# Patient Record
Sex: MALE | Race: WHITE | Employment: UNEMPLOYED | URBAN - METROPOLITAN AREA
[De-identification: names, ages, dates, MRNs, and addresses within clinical notes are randomized per-mention and may not be internally consistent; named-entity substitution may affect disease eponyms.]

---

## 2017-06-27 ENCOUNTER — HOSPITAL ENCOUNTER (OUTPATIENT)
Dept: OTHER | Age: 57
Discharge: HOME | End: 2017-06-27
Attending: INTERNAL MEDICINE | Admitting: INTERNAL MEDICINE

## 2017-06-27 LAB
ALBUMIN SERPL-MCNC: 4.3 GM/DL (ref 3.4–5)
ALP BLD-CCNC: 137 IU/L (ref 40–128)
ALT SERPL-CCNC: 34 U/L (ref 10–40)
ANION GAP SERPL CALCULATED.3IONS-SCNC: 16 MMOL/L (ref 4–16)
AST SERPL-CCNC: 22 IU/L (ref 15–37)
BILIRUB SERPL-MCNC: 0.7 MG/DL (ref 0–1)
BUN BLDV-MCNC: 16 MG/DL (ref 6–23)
CALCIUM SERPL-MCNC: 9.9 MG/DL (ref 8.3–10.6)
CHLORIDE BLD-SCNC: 103 MMOL/L (ref 99–110)
CO2: 23 MMOL/L (ref 21–32)
CREAT SERPL-MCNC: 0.9 MG/DL (ref 0.9–1.3)
GFR AFRICAN AMERICAN: >60 ML/MIN/1.73M2
GFR NON-AFRICAN AMERICAN: >60 ML/MIN/1.73M2
GLUCOSE BLD-MCNC: 117 MG/DL (ref 70–140)
MAGNESIUM: 2.2 MG/DL (ref 1.8–2.4)
POTASSIUM SERPL-SCNC: 4.3 MMOL/L (ref 3.5–5.1)
SODIUM BLD-SCNC: 142 MMOL/L (ref 135–145)
TOTAL PROTEIN: 7.1 GM/DL (ref 6.4–8.2)

## 2017-07-10 ENCOUNTER — HOSPITAL ENCOUNTER (OUTPATIENT)
Dept: PET IMAGING | Age: 57
Discharge: HOME | End: 2017-07-10
Attending: RADIOLOGY | Admitting: RADIOLOGY

## 2017-07-10 DIAGNOSIS — C09.8 CANCER OF OVERLAPPING SITES OF TONSIL (HCC): ICD-10-CM

## 2017-07-11 ENCOUNTER — HOSPITAL ENCOUNTER (OUTPATIENT)
Dept: OTHER | Age: 57
Discharge: HOME | End: 2017-07-11
Attending: INTERNAL MEDICINE | Admitting: INTERNAL MEDICINE

## 2017-07-11 LAB
ALBUMIN SERPL-MCNC: 4.2 GM/DL (ref 3.4–5)
ALP BLD-CCNC: 128 IU/L (ref 40–129)
ALT SERPL-CCNC: 45 U/L (ref 10–40)
ANION GAP SERPL CALCULATED.3IONS-SCNC: 15 MMOL/L (ref 4–16)
AST SERPL-CCNC: 26 IU/L (ref 15–37)
BILIRUB SERPL-MCNC: 0.6 MG/DL (ref 0–1)
BUN BLDV-MCNC: 20 MG/DL (ref 6–23)
CALCIUM SERPL-MCNC: 9.4 MG/DL (ref 8.3–10.6)
CHLORIDE BLD-SCNC: 106 MMOL/L (ref 99–110)
CO2: 20 MMOL/L (ref 21–32)
CREAT SERPL-MCNC: 1 MG/DL (ref 0.9–1.3)
GFR AFRICAN AMERICAN: >60 ML/MIN/1.73M2
GFR NON-AFRICAN AMERICAN: >60 ML/MIN/1.73M2
GLUCOSE BLD-MCNC: 113 MG/DL (ref 70–140)
MAGNESIUM: 2.3 MG/DL (ref 1.8–2.4)
POTASSIUM SERPL-SCNC: 4.6 MMOL/L (ref 3.5–5.1)
SODIUM BLD-SCNC: 141 MMOL/L (ref 135–145)
TOTAL PROTEIN: 6.9 GM/DL (ref 6.4–8.2)

## 2017-07-18 ENCOUNTER — HOSPITAL ENCOUNTER (OUTPATIENT)
Dept: OTHER | Age: 57
Discharge: HOME | End: 2017-07-18
Attending: INTERNAL MEDICINE | Admitting: INTERNAL MEDICINE

## 2017-07-18 LAB
ALBUMIN SERPL-MCNC: 4.2 GM/DL (ref 3.4–5)
ALP BLD-CCNC: 116 IU/L (ref 40–129)
ALT SERPL-CCNC: 37 U/L (ref 10–40)
ANION GAP SERPL CALCULATED.3IONS-SCNC: 15 MMOL/L (ref 4–16)
AST SERPL-CCNC: 26 IU/L (ref 15–37)
BILIRUB SERPL-MCNC: 0.8 MG/DL (ref 0–1)
BUN BLDV-MCNC: 16 MG/DL (ref 6–23)
CALCIUM SERPL-MCNC: 9.2 MG/DL (ref 8.3–10.6)
CHLORIDE BLD-SCNC: 102 MMOL/L (ref 99–110)
CO2: 22 MMOL/L (ref 21–32)
CREAT SERPL-MCNC: 0.9 MG/DL (ref 0.9–1.3)
GFR AFRICAN AMERICAN: >60 ML/MIN/1.73M2
GFR NON-AFRICAN AMERICAN: >60 ML/MIN/1.73M2
GLUCOSE BLD-MCNC: 88 MG/DL (ref 70–140)
MAGNESIUM: 2.5 MG/DL (ref 1.8–2.4)
POTASSIUM SERPL-SCNC: 4.8 MMOL/L (ref 3.5–5.1)
SODIUM BLD-SCNC: 139 MMOL/L (ref 135–145)
TOTAL PROTEIN: 6.7 GM/DL (ref 6.4–8.2)

## 2017-08-01 ENCOUNTER — HOSPITAL ENCOUNTER (OUTPATIENT)
Dept: OTHER | Age: 57
Discharge: HOME | End: 2017-08-01
Attending: INTERNAL MEDICINE | Admitting: INTERNAL MEDICINE

## 2017-08-01 LAB
ALBUMIN SERPL-MCNC: 3.9 GM/DL (ref 3.4–5)
ALP BLD-CCNC: 113 IU/L (ref 40–128)
ALT SERPL-CCNC: 25 U/L (ref 10–40)
ANION GAP SERPL CALCULATED.3IONS-SCNC: 17 MMOL/L (ref 4–16)
AST SERPL-CCNC: 17 IU/L (ref 15–37)
BILIRUB SERPL-MCNC: 0.6 MG/DL (ref 0–1)
BUN BLDV-MCNC: 26 MG/DL (ref 6–23)
CALCIUM SERPL-MCNC: 8.9 MG/DL (ref 8.3–10.6)
CHLORIDE BLD-SCNC: 100 MMOL/L (ref 99–110)
CO2: 22 MMOL/L (ref 21–32)
CREAT SERPL-MCNC: 1 MG/DL (ref 0.9–1.3)
GFR AFRICAN AMERICAN: >60 ML/MIN/1.73M2
GFR NON-AFRICAN AMERICAN: >60 ML/MIN/1.73M2
GLUCOSE BLD-MCNC: 113 MG/DL (ref 70–140)
MAGNESIUM: 2.1 MG/DL (ref 1.8–2.4)
POTASSIUM SERPL-SCNC: 3.9 MMOL/L (ref 3.5–5.1)
SODIUM BLD-SCNC: 139 MMOL/L (ref 135–145)
TOTAL PROTEIN: 6.4 GM/DL (ref 6.4–8.2)

## 2017-08-08 ENCOUNTER — HOSPITAL ENCOUNTER (OUTPATIENT)
Dept: OTHER | Age: 57
Discharge: HOME | End: 2017-08-08
Attending: INTERNAL MEDICINE | Admitting: INTERNAL MEDICINE

## 2017-08-08 LAB
ALBUMIN SERPL-MCNC: 4 GM/DL (ref 3.4–5)
ALP BLD-CCNC: 107 IU/L (ref 40–129)
ALT SERPL-CCNC: 27 U/L (ref 10–40)
ANION GAP SERPL CALCULATED.3IONS-SCNC: 14 MMOL/L (ref 4–16)
AST SERPL-CCNC: 21 IU/L (ref 15–37)
BILIRUB SERPL-MCNC: 0.5 MG/DL (ref 0–1)
BUN BLDV-MCNC: 26 MG/DL (ref 6–23)
CALCIUM SERPL-MCNC: 9 MG/DL (ref 8.3–10.6)
CHLORIDE BLD-SCNC: 100 MMOL/L (ref 99–110)
CO2: 24 MMOL/L (ref 21–32)
CREAT SERPL-MCNC: 1 MG/DL (ref 0.9–1.3)
GFR AFRICAN AMERICAN: >60 ML/MIN/1.73M2
GFR NON-AFRICAN AMERICAN: >60 ML/MIN/1.73M2
GLUCOSE BLD-MCNC: 118 MG/DL (ref 70–140)
MAGNESIUM: 2.4 MG/DL (ref 1.8–2.4)
POTASSIUM SERPL-SCNC: 4 MMOL/L (ref 3.5–5.1)
SODIUM BLD-SCNC: 138 MMOL/L (ref 135–145)
TOTAL PROTEIN: 6.7 GM/DL (ref 6.4–8.2)

## 2017-08-15 ENCOUNTER — HOSPITAL ENCOUNTER (OUTPATIENT)
Dept: OTHER | Age: 57
Discharge: HOME | End: 2017-08-15
Attending: INTERNAL MEDICINE | Admitting: INTERNAL MEDICINE

## 2017-08-15 LAB
ALBUMIN SERPL-MCNC: 4.2 GM/DL (ref 3.4–5)
ALP BLD-CCNC: 112 IU/L (ref 40–128)
ALT SERPL-CCNC: 28 U/L (ref 10–40)
ANION GAP SERPL CALCULATED.3IONS-SCNC: 17 MMOL/L (ref 4–16)
AST SERPL-CCNC: 24 IU/L (ref 15–37)
BILIRUB SERPL-MCNC: 0.5 MG/DL (ref 0–1)
BUN BLDV-MCNC: 29 MG/DL (ref 6–23)
CALCIUM SERPL-MCNC: 9.3 MG/DL (ref 8.3–10.6)
CHLORIDE BLD-SCNC: 97 MMOL/L (ref 99–110)
CO2: 23 MMOL/L (ref 21–32)
CREAT SERPL-MCNC: 1.1 MG/DL (ref 0.9–1.3)
GFR AFRICAN AMERICAN: >60 ML/MIN/1.73M2
GFR NON-AFRICAN AMERICAN: >60 ML/MIN/1.73M2
GLUCOSE BLD-MCNC: 106 MG/DL (ref 70–140)
MAGNESIUM: 2.2 MG/DL (ref 1.8–2.4)
POTASSIUM SERPL-SCNC: 3.9 MMOL/L (ref 3.5–5.1)
SODIUM BLD-SCNC: 137 MMOL/L (ref 135–145)
TOTAL PROTEIN: 7.1 GM/DL (ref 6.4–8.2)

## 2017-08-22 ENCOUNTER — HOSPITAL ENCOUNTER (OUTPATIENT)
Dept: OTHER | Age: 57
Discharge: HOME | End: 2017-08-22
Attending: INTERNAL MEDICINE | Admitting: INTERNAL MEDICINE

## 2017-08-22 LAB
ALBUMIN SERPL-MCNC: 3.5 GM/DL (ref 3.4–5)
ALP BLD-CCNC: 89 IU/L (ref 40–128)
ALT SERPL-CCNC: 23 U/L (ref 10–40)
ANION GAP SERPL CALCULATED.3IONS-SCNC: 13 MMOL/L (ref 4–16)
AST SERPL-CCNC: 19 IU/L (ref 15–37)
BILIRUB SERPL-MCNC: 0.6 MG/DL (ref 0–1)
BUN BLDV-MCNC: 27 MG/DL (ref 6–23)
CALCIUM SERPL-MCNC: 8.1 MG/DL (ref 8.3–10.6)
CHLORIDE BLD-SCNC: 101 MMOL/L (ref 99–110)
CO2: 23 MMOL/L (ref 21–32)
CREAT SERPL-MCNC: 1 MG/DL (ref 0.9–1.3)
GFR AFRICAN AMERICAN: >60 ML/MIN/1.73M2
GFR NON-AFRICAN AMERICAN: >60 ML/MIN/1.73M2
GLUCOSE BLD-MCNC: 119 MG/DL (ref 70–140)
MAGNESIUM: 1.8 MG/DL (ref 1.8–2.4)
POTASSIUM SERPL-SCNC: 3.7 MMOL/L (ref 3.5–5.1)
SODIUM BLD-SCNC: 137 MMOL/L (ref 135–145)
TOTAL PROTEIN: 6 GM/DL (ref 6.4–8.2)

## 2017-08-31 ENCOUNTER — TELEPHONE (OUTPATIENT)
Dept: NUTRITION | Age: 57
End: 2017-08-31

## 2017-10-30 ENCOUNTER — HOSPITAL ENCOUNTER (OUTPATIENT)
Dept: OTHER | Age: 57
Discharge: HOME | End: 2017-10-30
Attending: INTERNAL MEDICINE | Admitting: INTERNAL MEDICINE

## 2017-10-30 LAB
ALBUMIN SERPL-MCNC: 4.4 GM/DL (ref 3.4–5)
ALP BLD-CCNC: 93 IU/L (ref 40–129)
ALT SERPL-CCNC: 15 U/L (ref 10–40)
ANION GAP SERPL CALCULATED.3IONS-SCNC: 11 MMOL/L (ref 4–16)
AST SERPL-CCNC: 17 IU/L (ref 15–37)
BILIRUB SERPL-MCNC: 0.5 MG/DL (ref 0–1)
BUN BLDV-MCNC: 15 MG/DL (ref 6–23)
CALCIUM SERPL-MCNC: 9.8 MG/DL (ref 8.3–10.6)
CHLORIDE BLD-SCNC: 101 MMOL/L (ref 99–110)
CO2: 30 MMOL/L (ref 21–32)
CREAT SERPL-MCNC: 1 MG/DL (ref 0.9–1.3)
GFR AFRICAN AMERICAN: >60 ML/MIN/1.73M2
GFR NON-AFRICAN AMERICAN: >60 ML/MIN/1.73M2
GLUCOSE BLD-MCNC: 100 MG/DL (ref 70–140)
POTASSIUM SERPL-SCNC: 4.6 MMOL/L (ref 3.5–5.1)
SODIUM BLD-SCNC: 142 MMOL/L (ref 135–145)
TOTAL PROTEIN: 6.9 GM/DL (ref 6.4–8.2)

## 2017-11-16 ENCOUNTER — HOSPITAL ENCOUNTER (OUTPATIENT)
Dept: GENERAL RADIOLOGY | Age: 57
Discharge: HOME | End: 2017-11-16
Attending: RADIOLOGY | Admitting: RADIOLOGY

## 2017-11-16 DIAGNOSIS — C09.8 MALIGNANT NEOPLASM OF OVERLAPPING SITES OF TONSIL (HCC): ICD-10-CM

## 2018-01-10 ENCOUNTER — HOSPITAL ENCOUNTER (OUTPATIENT)
Dept: GENERAL RADIOLOGY | Age: 58
Discharge: HOME | End: 2018-01-10
Attending: OTOLARYNGOLOGY | Admitting: OTOLARYNGOLOGY

## 2018-01-10 DIAGNOSIS — R13.14 DYSPHAGIA, PHARYNGOESOPHAGEAL: ICD-10-CM

## 2018-01-10 NOTE — PROCEDURES
Gustavo was referred for OP MBSS by his ENT due to c/o foods (bread) sticking in his right pharynx. 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 He c/o dysphagia, specifically to solids in the right pharynx with occasional choking, particularly with bread. Pt and wife deny any recent h/o of chest congestion or pneumonia. They report that pt initally lost 30 pounds but weight is trending upward slightly and pt does have PEG tube for nutrition (4 cans/day) in addition to eating by mouth. Pt carries water that he sips frequently for xerostomia and has been seen by a speech therapist for therapeutic exercises which I advised him to continue. The oral phase was moderately impaired due to reduce tongue base retraction resulting in moderate residue in the valleculae for all solids including puree, soft and regular solids. Edematous epiglottis and UES further contributing to residue. Material cleared by 50% with second and third swallows leaving a mild residue. The pharyngeal phase was characterized by mildly delayed initiation with premature pharyngeal entry and aspiration for thins by straw with immediate cough but no clearance of material.  Shallow penetration midway to the vocal folds for thins by cup 3/4 trials with slightly deeper penetration 1/4. All trials cleared from the laryngeal vestibule. Nectar thick liquids were well tolerated with only minimal penetration beneath the epiglottis and mild pharyngeal residue. A chin-tuck and right head turn were tested and ineffective for pharyngeal clearance (possibly due to swelling) or reduction of liquid penetration. Recommend:  Regular diet/Thins by CUP. NO STRAWS for thin liquids. Moisten bread or any other problematic textures with extended mastication time. Continue therapeutic exercises provided in pt's initial speech therapy.   Advise

## 2018-02-05 ENCOUNTER — HOSPITAL ENCOUNTER (OUTPATIENT)
Dept: OTHER | Age: 58
Discharge: HOME | End: 2018-02-05
Attending: INTERNAL MEDICINE | Admitting: INTERNAL MEDICINE

## 2018-02-05 LAB
ALBUMIN SERPL-MCNC: 4.6 GM/DL (ref 3.4–5)
ALP BLD-CCNC: 110 IU/L (ref 40–129)
ALT SERPL-CCNC: 14 U/L (ref 10–40)
ANION GAP SERPL CALCULATED.3IONS-SCNC: 11 MMOL/L (ref 4–16)
AST SERPL-CCNC: 18 IU/L (ref 15–37)
BILIRUB SERPL-MCNC: 0.5 MG/DL (ref 0–1)
BUN BLDV-MCNC: 16 MG/DL (ref 6–23)
CALCIUM SERPL-MCNC: 9.8 MG/DL (ref 8.3–10.6)
CHLORIDE BLD-SCNC: 99 MMOL/L (ref 99–110)
CO2: 30 MMOL/L (ref 21–32)
CREAT SERPL-MCNC: 1 MG/DL (ref 0.9–1.3)
FERRITIN: 216 NG/ML (ref 30–400)
FOLATE: >20 NG/ML (ref 3.1–17.5)
GFR AFRICAN AMERICAN: >60 ML/MIN/1.73M2
GFR NON-AFRICAN AMERICAN: >60 ML/MIN/1.73M2
GLUCOSE BLD-MCNC: 89 MG/DL (ref 70–99)
IRON: 60 UG/DL (ref 59–158)
PCT TRANSFERRIN: 20 % (ref 10–44)
POTASSIUM SERPL-SCNC: 4 MMOL/L (ref 3.5–5.1)
SODIUM BLD-SCNC: 140 MMOL/L (ref 135–145)
TOTAL IRON BINDING CAPACITY: 294 UG/DL (ref 250–450)
TOTAL PROTEIN: 7.3 GM/DL (ref 6.4–8.2)
TSH HIGH SENSITIVITY: 7.17 UIU/ML (ref 0.27–4.2)
UNSATURATED IRON BINDING CAPACITY: 234 UG/DL (ref 110–370)
VITAMIN B-12: 731.5 PG/ML (ref 211–911)

## 2018-05-14 ENCOUNTER — HOSPITAL ENCOUNTER (OUTPATIENT)
Dept: OTHER | Age: 58
Discharge: HOME | End: 2018-05-14
Attending: INTERNAL MEDICINE | Admitting: INTERNAL MEDICINE

## 2018-05-14 LAB
ALBUMIN SERPL-MCNC: 4.2 GM/DL (ref 3.4–5)
ALP BLD-CCNC: 141 IU/L (ref 40–128)
ALT SERPL-CCNC: 15 U/L (ref 10–40)
ANION GAP SERPL CALCULATED.3IONS-SCNC: 12 MMOL/L (ref 4–16)
AST SERPL-CCNC: 18 IU/L (ref 15–37)
BILIRUB SERPL-MCNC: 0.4 MG/DL (ref 0–1)
BUN BLDV-MCNC: 21 MG/DL (ref 6–23)
CALCIUM SERPL-MCNC: 9.3 MG/DL (ref 8.3–10.6)
CHLORIDE BLD-SCNC: 99 MMOL/L (ref 99–110)
CO2: 27 MMOL/L (ref 21–32)
CREAT SERPL-MCNC: 1 MG/DL (ref 0.9–1.3)
GFR AFRICAN AMERICAN: >60 ML/MIN/1.73M2
GFR NON-AFRICAN AMERICAN: >60 ML/MIN/1.73M2
GLUCOSE BLD-MCNC: 130 MG/DL (ref 70–99)
POTASSIUM SERPL-SCNC: 4 MMOL/L (ref 3.5–5.1)
SODIUM BLD-SCNC: 138 MMOL/L (ref 135–145)
T4 FREE: 1.01 NG/DL (ref 0.9–1.8)
TOTAL PROTEIN: 6.5 GM/DL (ref 6.4–8.2)
TSH HIGH SENSITIVITY: 7.45 UIU/ML (ref 0.27–4.2)

## 2018-11-08 ENCOUNTER — HOSPITAL ENCOUNTER (OUTPATIENT)
Age: 58
Setting detail: SPECIMEN
Discharge: HOME | End: 2018-11-08
Payer: COMMERCIAL

## 2018-11-08 LAB
ALBUMIN SERPL-MCNC: 4.1 GM/DL (ref 3.4–5)
ALP BLD-CCNC: 270 IU/L (ref 40–129)
ALT SERPL-CCNC: 21 U/L (ref 10–40)
ANION GAP SERPL CALCULATED.3IONS-SCNC: 10 MMOL/L (ref 4–16)
AST SERPL-CCNC: 24 IU/L (ref 15–37)
BILIRUB SERPL-MCNC: 0.6 MG/DL (ref 0–1)
BUN BLDV-MCNC: 19 MG/DL (ref 6–23)
CALCIUM SERPL-MCNC: 9.4 MG/DL (ref 8.3–10.6)
CHLORIDE BLD-SCNC: 101 MMOL/L (ref 99–110)
CO2: 29 MMOL/L (ref 21–32)
CREAT SERPL-MCNC: 0.9 MG/DL (ref 0.9–1.3)
GFR AFRICAN AMERICAN: >60 ML/MIN/1.73M2
GFR NON-AFRICAN AMERICAN: >60 ML/MIN/1.73M2
GLUCOSE BLD-MCNC: 112 MG/DL (ref 70–99)
POTASSIUM SERPL-SCNC: 4.4 MMOL/L (ref 3.5–5.1)
SODIUM BLD-SCNC: 140 MMOL/L (ref 135–145)
T4 FREE: 1.09 NG/DL (ref 0.9–1.8)
TOTAL PROTEIN: 6.8 GM/DL (ref 6.4–8.2)
TSH HIGH SENSITIVITY: 4.88 UIU/ML (ref 0.27–4.2)

## 2018-11-08 PROCEDURE — 84439 ASSAY OF FREE THYROXINE: CPT

## 2018-11-08 PROCEDURE — 80053 COMPREHEN METABOLIC PANEL: CPT

## 2018-11-08 PROCEDURE — 84443 ASSAY THYROID STIM HORMONE: CPT

## 2019-01-16 RX ORDER — LEVOTHYROXINE SODIUM 0.05 MG/1
50 TABLET ORAL DAILY
Status: ON HOLD | COMMUNITY
End: 2019-05-04

## 2019-01-17 ENCOUNTER — HOSPITAL ENCOUNTER (OUTPATIENT)
Dept: INTERVENTIONAL RADIOLOGY/VASCULAR | Age: 59
Discharge: HOME | End: 2019-01-17
Payer: COMMERCIAL

## 2019-01-17 VITALS
DIASTOLIC BLOOD PRESSURE: 68 MMHG | WEIGHT: 132 LBS | HEART RATE: 64 BPM | BODY MASS INDEX: 22.53 KG/M2 | HEIGHT: 64 IN | RESPIRATION RATE: 16 BRPM | SYSTOLIC BLOOD PRESSURE: 133 MMHG | TEMPERATURE: 99.4 F | OXYGEN SATURATION: 97 %

## 2019-01-17 LAB
APTT: 32.1 SECONDS (ref 21.2–33)
HCT VFR BLD CALC: 39.8 % (ref 42–52)
HEMOGLOBIN: 13 GM/DL (ref 13.5–18)
INR BLD: 0.97 INDEX
MCH RBC QN AUTO: 29.5 PG (ref 27–31)
MCHC RBC AUTO-ENTMCNC: 32.7 % (ref 32–36)
MCV RBC AUTO: 90.2 FL (ref 78–100)
PDW BLD-RTO: 14.2 % (ref 11.7–14.9)
PLATELET # BLD: 226 K/CU MM (ref 140–440)
PMV BLD AUTO: 9.9 FL (ref 7.5–11.1)
PROTHROMBIN TIME: 11.3 SECONDS (ref 9.12–12.5)
RBC # BLD: 4.41 M/CU MM (ref 4.6–6.2)
WBC # BLD: 7.7 K/CU MM (ref 4–10.5)

## 2019-01-17 PROCEDURE — 2709999900 HC NON-CHARGEABLE SUPPLY

## 2019-01-17 PROCEDURE — 7100000010 HC PHASE II RECOVERY - FIRST 15 MIN

## 2019-01-17 PROCEDURE — 76942 ECHO GUIDE FOR BIOPSY: CPT

## 2019-01-17 PROCEDURE — 88305 TISSUE EXAM BY PATHOLOGIST: CPT

## 2019-01-17 PROCEDURE — C1729 CATH, DRAINAGE: HCPCS

## 2019-01-17 PROCEDURE — 85610 PROTHROMBIN TIME: CPT

## 2019-01-17 PROCEDURE — 88173 CYTOPATH EVAL FNA REPORT: CPT

## 2019-01-17 PROCEDURE — 2580000003 HC RX 258: Performed by: RADIOLOGY

## 2019-01-17 PROCEDURE — 7100000011 HC PHASE II RECOVERY - ADDTL 15 MIN

## 2019-01-17 PROCEDURE — 10160 PNXR ASPIR ABSC HMTMA BULLA: CPT

## 2019-01-17 PROCEDURE — 85730 THROMBOPLASTIN TIME PARTIAL: CPT

## 2019-01-17 PROCEDURE — 85027 COMPLETE CBC AUTOMATED: CPT

## 2019-01-17 RX ORDER — SODIUM CHLORIDE 0.9 % (FLUSH) 0.9 %
10 SYRINGE (ML) INJECTION PRN
Status: DISCONTINUED | OUTPATIENT
Start: 2019-01-17 | End: 2019-01-18 | Stop reason: HOSPADM

## 2019-01-17 RX ADMIN — SODIUM CHLORIDE, PRESERVATIVE FREE 10 ML: 5 INJECTION INTRAVENOUS at 09:56

## 2019-01-17 ASSESSMENT — PAIN - FUNCTIONAL ASSESSMENT
PAIN_FUNCTIONAL_ASSESSMENT: 0-10
PAIN_FUNCTIONAL_ASSESSMENT: 0-10

## 2019-05-04 ENCOUNTER — APPOINTMENT (OUTPATIENT)
Dept: CT IMAGING | Age: 59
DRG: 442 | End: 2019-05-04
Payer: COMMERCIAL

## 2019-05-04 ENCOUNTER — APPOINTMENT (OUTPATIENT)
Dept: GENERAL RADIOLOGY | Age: 59
DRG: 442 | End: 2019-05-04
Payer: COMMERCIAL

## 2019-05-04 ENCOUNTER — HOSPITAL ENCOUNTER (INPATIENT)
Age: 59
LOS: 7 days | Discharge: ANOTHER HOSPITAL | DRG: 442 | End: 2019-05-11
Attending: EMERGENCY MEDICINE | Admitting: HOSPITALIST
Payer: COMMERCIAL

## 2019-05-04 DIAGNOSIS — R10.9 FLANK PAIN: Primary | ICD-10-CM

## 2019-05-04 DIAGNOSIS — R10.9 ABDOMINAL PAIN, UNSPECIFIED ABDOMINAL LOCATION: ICD-10-CM

## 2019-05-04 DIAGNOSIS — Z85.9 HISTORY OF CANCER: ICD-10-CM

## 2019-05-04 DIAGNOSIS — R16.0 LIVER MASS: ICD-10-CM

## 2019-05-04 DIAGNOSIS — K86.89 PANCREATIC MASS: ICD-10-CM

## 2019-05-04 LAB
ALBUMIN SERPL-MCNC: 3.9 GM/DL (ref 3.4–5)
ALP BLD-CCNC: 349 IU/L (ref 40–129)
ALT SERPL-CCNC: 28 U/L (ref 10–40)
ANION GAP SERPL CALCULATED.3IONS-SCNC: 14 MMOL/L (ref 4–16)
APTT: 31 SECONDS (ref 21.2–33)
AST SERPL-CCNC: 51 IU/L (ref 15–37)
BACTERIA: NEGATIVE /HPF
BASOPHILS ABSOLUTE: 0 K/CU MM
BASOPHILS RELATIVE PERCENT: 0.3 % (ref 0–1)
BILIRUB SERPL-MCNC: 0.5 MG/DL (ref 0–1)
BILIRUBIN URINE: NEGATIVE MG/DL
BLOOD, URINE: NEGATIVE
BUN BLDV-MCNC: 29 MG/DL (ref 6–23)
CALCIUM SERPL-MCNC: 9.6 MG/DL (ref 8.3–10.6)
CHLORIDE BLD-SCNC: 96 MMOL/L (ref 99–110)
CLARITY: CLEAR
CO2: 26 MMOL/L (ref 21–32)
COLOR: YELLOW
CREAT SERPL-MCNC: 1.3 MG/DL (ref 0.9–1.3)
DIFFERENTIAL TYPE: ABNORMAL
EOSINOPHILS ABSOLUTE: 0.2 K/CU MM
EOSINOPHILS RELATIVE PERCENT: 2 % (ref 0–3)
GFR AFRICAN AMERICAN: >60 ML/MIN/1.73M2
GFR NON-AFRICAN AMERICAN: 57 ML/MIN/1.73M2
GLUCOSE BLD-MCNC: 111 MG/DL (ref 70–99)
GLUCOSE, URINE: NEGATIVE MG/DL
HCT VFR BLD CALC: 32 % (ref 42–52)
HEMOGLOBIN: 10.2 GM/DL (ref 13.5–18)
IMMATURE NEUTROPHIL %: 0.7 % (ref 0–0.43)
INR BLD: 1.04 INDEX
KETONES, URINE: NEGATIVE MG/DL
LACTATE: 1.5 MMOL/L (ref 0.4–2)
LEUKOCYTE ESTERASE, URINE: NEGATIVE
LIPASE: 43 IU/L (ref 13–60)
LYMPHOCYTES ABSOLUTE: 1.5 K/CU MM
LYMPHOCYTES RELATIVE PERCENT: 14.2 % (ref 24–44)
MCH RBC QN AUTO: 29.1 PG (ref 27–31)
MCHC RBC AUTO-ENTMCNC: 31.9 % (ref 32–36)
MCV RBC AUTO: 91.4 FL (ref 78–100)
MONOCYTES ABSOLUTE: 1.1 K/CU MM
MONOCYTES RELATIVE PERCENT: 10.8 % (ref 0–4)
NITRITE URINE, QUANTITATIVE: NEGATIVE
NUCLEATED RBC %: 0 %
PDW BLD-RTO: 13.6 % (ref 11.7–14.9)
PH, URINE: 5 (ref 5–8)
PLATELET # BLD: 350 K/CU MM (ref 140–440)
PMV BLD AUTO: 9.7 FL (ref 7.5–11.1)
POTASSIUM SERPL-SCNC: 4.4 MMOL/L (ref 3.5–5.1)
PRO-BNP: 205.9 PG/ML
PROTEIN UA: 30 MG/DL
PROTHROMBIN TIME: 11.9 SECONDS (ref 9.12–12.5)
RBC # BLD: 3.5 M/CU MM (ref 4.6–6.2)
RBC URINE: 1 /HPF (ref 0–3)
SEGMENTED NEUTROPHILS ABSOLUTE COUNT: 7.6 K/CU MM
SEGMENTED NEUTROPHILS RELATIVE PERCENT: 72 % (ref 36–66)
SODIUM BLD-SCNC: 136 MMOL/L (ref 135–145)
SPECIFIC GRAVITY UA: 1.05 (ref 1–1.03)
SPECIFIC GRAVITY UA: ABNORMAL (ref 1–1.03)
SQUAMOUS EPITHELIAL: <1 /HPF
TOTAL CK: 38 IU/L (ref 38–174)
TOTAL IMMATURE NEUTOROPHIL: 0.07 K/CU MM
TOTAL NUCLEATED RBC: 0 K/CU MM
TOTAL PROTEIN: 7.5 GM/DL (ref 6.4–8.2)
TRICHOMONAS: ABNORMAL /HPF
TROPONIN T: <0.01 NG/ML
UROBILINOGEN, URINE: NORMAL MG/DL (ref 0.2–1)
WBC # BLD: 10.6 K/CU MM (ref 4–10.5)
WBC UA: <1 /HPF (ref 0–2)

## 2019-05-04 PROCEDURE — 36415 COLL VENOUS BLD VENIPUNCTURE: CPT

## 2019-05-04 PROCEDURE — 87040 BLOOD CULTURE FOR BACTERIA: CPT

## 2019-05-04 PROCEDURE — 6370000000 HC RX 637 (ALT 250 FOR IP): Performed by: INTERNAL MEDICINE

## 2019-05-04 PROCEDURE — 84484 ASSAY OF TROPONIN QUANT: CPT

## 2019-05-04 PROCEDURE — 6370000000 HC RX 637 (ALT 250 FOR IP): Performed by: HOSPITALIST

## 2019-05-04 PROCEDURE — 71045 X-RAY EXAM CHEST 1 VIEW: CPT

## 2019-05-04 PROCEDURE — 83880 ASSAY OF NATRIURETIC PEPTIDE: CPT

## 2019-05-04 PROCEDURE — 6360000004 HC RX CONTRAST MEDICATION: Performed by: EMERGENCY MEDICINE

## 2019-05-04 PROCEDURE — 85610 PROTHROMBIN TIME: CPT

## 2019-05-04 PROCEDURE — 2580000003 HC RX 258: Performed by: EMERGENCY MEDICINE

## 2019-05-04 PROCEDURE — 93005 ELECTROCARDIOGRAM TRACING: CPT | Performed by: EMERGENCY MEDICINE

## 2019-05-04 PROCEDURE — 87086 URINE CULTURE/COLONY COUNT: CPT

## 2019-05-04 PROCEDURE — 83690 ASSAY OF LIPASE: CPT

## 2019-05-04 PROCEDURE — 2580000003 HC RX 258: Performed by: HOSPITALIST

## 2019-05-04 PROCEDURE — 6360000002 HC RX W HCPCS: Performed by: HOSPITALIST

## 2019-05-04 PROCEDURE — 85730 THROMBOPLASTIN TIME PARTIAL: CPT

## 2019-05-04 PROCEDURE — 1200000000 HC SEMI PRIVATE

## 2019-05-04 PROCEDURE — 2580000003 HC RX 258: Performed by: INTERNAL MEDICINE

## 2019-05-04 PROCEDURE — 80053 COMPREHEN METABOLIC PANEL: CPT

## 2019-05-04 PROCEDURE — 71275 CT ANGIOGRAPHY CHEST: CPT

## 2019-05-04 PROCEDURE — 96376 TX/PRO/DX INJ SAME DRUG ADON: CPT

## 2019-05-04 PROCEDURE — 96365 THER/PROPH/DIAG IV INF INIT: CPT

## 2019-05-04 PROCEDURE — 6360000002 HC RX W HCPCS: Performed by: EMERGENCY MEDICINE

## 2019-05-04 PROCEDURE — 85025 COMPLETE CBC W/AUTO DIFF WBC: CPT

## 2019-05-04 PROCEDURE — 94761 N-INVAS EAR/PLS OXIMETRY MLT: CPT

## 2019-05-04 PROCEDURE — 83605 ASSAY OF LACTIC ACID: CPT

## 2019-05-04 PROCEDURE — 81001 URINALYSIS AUTO W/SCOPE: CPT

## 2019-05-04 PROCEDURE — 96375 TX/PRO/DX INJ NEW DRUG ADDON: CPT

## 2019-05-04 PROCEDURE — 82550 ASSAY OF CK (CPK): CPT

## 2019-05-04 PROCEDURE — 74174 CTA ABD&PLVS W/CONTRAST: CPT

## 2019-05-04 PROCEDURE — 99285 EMERGENCY DEPT VISIT HI MDM: CPT

## 2019-05-04 RX ORDER — LEVOTHYROXINE SODIUM 0.05 MG/1
50 TABLET ORAL DAILY
Status: ON HOLD | COMMUNITY
End: 2019-05-08 | Stop reason: HOSPADM

## 2019-05-04 RX ORDER — ONDANSETRON 2 MG/ML
4 INJECTION INTRAMUSCULAR; INTRAVENOUS EVERY 6 HOURS PRN
Status: DISCONTINUED | OUTPATIENT
Start: 2019-05-04 | End: 2019-05-11 | Stop reason: HOSPADM

## 2019-05-04 RX ORDER — MORPHINE SULFATE 4 MG/ML
4 INJECTION, SOLUTION INTRAMUSCULAR; INTRAVENOUS EVERY 4 HOURS PRN
Status: DISCONTINUED | OUTPATIENT
Start: 2019-05-04 | End: 2019-05-11 | Stop reason: HOSPADM

## 2019-05-04 RX ORDER — 0.9 % SODIUM CHLORIDE 0.9 %
1000 INTRAVENOUS SOLUTION INTRAVENOUS ONCE
Status: COMPLETED | OUTPATIENT
Start: 2019-05-04 | End: 2019-05-04

## 2019-05-04 RX ORDER — ONDANSETRON 2 MG/ML
4 INJECTION INTRAMUSCULAR; INTRAVENOUS EVERY 30 MIN PRN
Status: DISCONTINUED | OUTPATIENT
Start: 2019-05-04 | End: 2019-05-04 | Stop reason: SDUPTHER

## 2019-05-04 RX ORDER — OXYCODONE HYDROCHLORIDE 5 MG/1
5 TABLET ORAL EVERY 4 HOURS PRN
Status: DISCONTINUED | OUTPATIENT
Start: 2019-05-04 | End: 2019-05-11 | Stop reason: HOSPADM

## 2019-05-04 RX ORDER — SODIUM CHLORIDE 0.9 % (FLUSH) 0.9 %
10 SYRINGE (ML) INJECTION PRN
Status: DISCONTINUED | OUTPATIENT
Start: 2019-05-04 | End: 2019-05-11 | Stop reason: HOSPADM

## 2019-05-04 RX ORDER — LEVOTHYROXINE SODIUM 0.03 MG/1
25 TABLET ORAL DAILY
Status: ON HOLD | COMMUNITY
End: 2019-05-04

## 2019-05-04 RX ORDER — LEVOTHYROXINE SODIUM 0.05 MG/1
50 TABLET ORAL DAILY
Status: DISCONTINUED | OUTPATIENT
Start: 2019-05-04 | End: 2019-05-06

## 2019-05-04 RX ORDER — SODIUM CHLORIDE 0.9 % (FLUSH) 0.9 %
10 SYRINGE (ML) INJECTION EVERY 12 HOURS SCHEDULED
Status: DISCONTINUED | OUTPATIENT
Start: 2019-05-04 | End: 2019-05-11 | Stop reason: HOSPADM

## 2019-05-04 RX ORDER — HYDROCODONE BITARTRATE AND ACETAMINOPHEN 5; 325 MG/1; MG/1
1 TABLET ORAL EVERY 6 HOURS PRN
Status: DISCONTINUED | OUTPATIENT
Start: 2019-05-04 | End: 2019-05-04

## 2019-05-04 RX ORDER — SODIUM CHLORIDE 0.9 % (FLUSH) 0.9 %
10 SYRINGE (ML) INJECTION 2 TIMES DAILY
Status: DISCONTINUED | OUTPATIENT
Start: 2019-05-04 | End: 2019-05-11 | Stop reason: HOSPADM

## 2019-05-04 RX ORDER — FENTANYL CITRATE 50 UG/ML
50 INJECTION, SOLUTION INTRAMUSCULAR; INTRAVENOUS
Status: DISCONTINUED | OUTPATIENT
Start: 2019-05-04 | End: 2019-05-04 | Stop reason: ALTCHOICE

## 2019-05-04 RX ORDER — SODIUM CHLORIDE 0.9 % (FLUSH) 0.9 %
10 SYRINGE (ML) INJECTION
Status: DISCONTINUED | OUTPATIENT
Start: 2019-05-04 | End: 2019-05-11 | Stop reason: HOSPADM

## 2019-05-04 RX ORDER — SODIUM CHLORIDE 9 MG/ML
INJECTION, SOLUTION INTRAVENOUS CONTINUOUS
Status: DISCONTINUED | OUTPATIENT
Start: 2019-05-04 | End: 2019-05-11 | Stop reason: HOSPADM

## 2019-05-04 RX ADMIN — FENTANYL CITRATE 50 MCG: 50 INJECTION, SOLUTION INTRAMUSCULAR; INTRAVENOUS at 01:53

## 2019-05-04 RX ADMIN — SODIUM CHLORIDE 1000 ML: 9 INJECTION, SOLUTION INTRAVENOUS at 00:26

## 2019-05-04 RX ADMIN — OXYCODONE HYDROCHLORIDE 5 MG: 5 TABLET ORAL at 21:43

## 2019-05-04 RX ADMIN — TAZOBACTAM SODIUM AND PIPERACILLIN SODIUM 3.38 G: 375; 3 INJECTION, SOLUTION INTRAVENOUS at 02:44

## 2019-05-04 RX ADMIN — IOPAMIDOL 80 ML: 755 INJECTION, SOLUTION INTRAVENOUS at 01:15

## 2019-05-04 RX ADMIN — ONDANSETRON 4 MG: 2 INJECTION INTRAMUSCULAR; INTRAVENOUS at 00:30

## 2019-05-04 RX ADMIN — ONDANSETRON 4 MG: 2 INJECTION INTRAMUSCULAR; INTRAVENOUS at 15:20

## 2019-05-04 RX ADMIN — SODIUM CHLORIDE: 9 INJECTION, SOLUTION INTRAVENOUS at 21:44

## 2019-05-04 RX ADMIN — SODIUM CHLORIDE 1000 ML: 9 INJECTION, SOLUTION INTRAVENOUS at 02:44

## 2019-05-04 RX ADMIN — ONDANSETRON 4 MG: 2 INJECTION INTRAMUSCULAR; INTRAVENOUS at 04:30

## 2019-05-04 RX ADMIN — ENOXAPARIN SODIUM 40 MG: 40 INJECTION SUBCUTANEOUS at 09:45

## 2019-05-04 RX ADMIN — SODIUM CHLORIDE: 9 INJECTION, SOLUTION INTRAVENOUS at 13:39

## 2019-05-04 RX ADMIN — SODIUM CHLORIDE: 9 INJECTION, SOLUTION INTRAVENOUS at 04:15

## 2019-05-04 RX ADMIN — LEVOTHYROXINE SODIUM 50 MCG: 50 TABLET ORAL at 09:45

## 2019-05-04 RX ADMIN — HYDROCODONE BITARTRATE AND ACETAMINOPHEN 1 TABLET: 5; 325 TABLET ORAL at 04:16

## 2019-05-04 RX ADMIN — OXYCODONE HYDROCHLORIDE 5 MG: 5 TABLET ORAL at 15:23

## 2019-05-04 RX ADMIN — SODIUM CHLORIDE 1000 ML: 9 INJECTION, SOLUTION INTRAVENOUS at 00:45

## 2019-05-04 RX ADMIN — OXYCODONE HYDROCHLORIDE 5 MG: 5 TABLET ORAL at 09:45

## 2019-05-04 RX ADMIN — FENTANYL CITRATE 50 MCG: 50 INJECTION, SOLUTION INTRAMUSCULAR; INTRAVENOUS at 00:47

## 2019-05-04 ASSESSMENT — PAIN DESCRIPTION - PROGRESSION
CLINICAL_PROGRESSION: NOT CHANGED
CLINICAL_PROGRESSION: GRADUALLY WORSENING
CLINICAL_PROGRESSION: NOT CHANGED

## 2019-05-04 ASSESSMENT — PAIN SCALES - GENERAL
PAINLEVEL_OUTOF10: 7
PAINLEVEL_OUTOF10: 8
PAINLEVEL_OUTOF10: 10
PAINLEVEL_OUTOF10: 7
PAINLEVEL_OUTOF10: 9
PAINLEVEL_OUTOF10: 10
PAINLEVEL_OUTOF10: 10
PAINLEVEL_OUTOF10: 8
PAINLEVEL_OUTOF10: 10

## 2019-05-04 ASSESSMENT — PAIN DESCRIPTION - ONSET
ONSET: ON-GOING

## 2019-05-04 ASSESSMENT — PAIN DESCRIPTION - PAIN TYPE
TYPE: ACUTE PAIN

## 2019-05-04 ASSESSMENT — ENCOUNTER SYMPTOMS
EYES NEGATIVE: 1
COUGH: 1
ALLERGIC/IMMUNOLOGIC NEGATIVE: 1
ABDOMINAL PAIN: 1
NAUSEA: 1

## 2019-05-04 ASSESSMENT — PAIN DESCRIPTION - LOCATION
LOCATION: FLANK
LOCATION: RIB CAGE

## 2019-05-04 ASSESSMENT — PAIN - FUNCTIONAL ASSESSMENT: PAIN_FUNCTIONAL_ASSESSMENT: PREVENTS OR INTERFERES SOME ACTIVE ACTIVITIES AND ADLS

## 2019-05-04 ASSESSMENT — PAIN DESCRIPTION - DESCRIPTORS
DESCRIPTORS: ACHING
DESCRIPTORS: ACHING;SHARP
DESCRIPTORS: ACHING;SHARP

## 2019-05-04 ASSESSMENT — PAIN DESCRIPTION - FREQUENCY
FREQUENCY: CONTINUOUS

## 2019-05-04 ASSESSMENT — PAIN DESCRIPTION - ORIENTATION
ORIENTATION: RIGHT

## 2019-05-04 NOTE — ED PROVIDER NOTES
[facility]      TRIAGE CHIEF COMPLAINT:   Flank Pain (Complaints right flank pain that started around 6pm )      [place]:  Shantel Melo is a 63 y.o. male that presents with complaint of right-sided flank pain abdominal pain chest wall pain history of cancer stage III no longer on chemo, radiation. Sent in by his oncologist for fevers and pain. On arrival he is diaphoretic and breathing fast has no chest pain or shortness of breath no history of pulmonary embolism has had a history of cough. No depressions or concerns. Constant pain. Dao Castillo REVIEW OF SYSTEMS:  At least 10 systems reviewed and otherwise acutely negative except as in the [address] Review of Systems   Constitutional: Positive for chills, diaphoresis, fatigue and fever. HENT: Negative. Eyes: Negative. Respiratory: Positive for cough. Cardiovascular: Positive for chest pain. Gastrointestinal: Positive for abdominal pain and nausea. Endocrine: Negative. Genitourinary: Negative. Musculoskeletal: Positive for myalgias. Allergic/Immunologic: Negative. Neurological: Negative. Hematological: Negative. Psychiatric/Behavioral: Negative. All other systems reviewed and are negative.       Past Medical History:   Diagnosis Date    Cancer [facility])     -(path report 5/2017- metastatic high grade squamous cell ca- from lymph node right neck mass and right tonsil)- doing chemo and radiation Dr Monica Brown and Dr Elizabeth Arroyo done with chemo and radiation- per pt on 3/6/2018    Dysphagia     \"just started 8/7/2017- can not tolerate any solid foods- can drink water- protein drinks- baby food\" for peg tube insertion 8/16/2017( per pt on 3/6/2018 can eat pretty much anything now so taking the peg tube out)    Fear of needles     HX OTHER MEDICAL     \"passes out whenever they start an IV- takes Ativan and have him smell alcohol wipe- seems to help\"    Liver mass     \"found with my yearly check- for liver bx 1/17/2019    file     Attends meetings of clubs or organizations: Not on file     Relationship status: Not on file    Intimate partner violence:     Fear of current or ex partner: Not on file     Emotionally abused: Not on file     Physically abused: Not on file     Forced sexual activity: Not on file   Other Topics Concern    Not on file   Social History Narrative    Not on file     Current Facility-Administered Medications   Medication Dose Route Frequency Provider Last Rate Last Dose    ondansetron (ZOFRAN) injection 4 mg  4 mg Intravenous Q30 Min PRN Tato Garret, DO   4 mg at 05/04/19 0030    0.9 % sodium chloride bolus  1,000 mL Intravenous Once Tato Garret, DO 1,000 mL/hr at 05/04/19 0244 1,000 mL at 05/04/19 0244    fentaNYL (SUBLIMAZE) injection 50 mcg  50 mcg Intravenous Q1H PRN Nasir Gordon, DO   50 mcg at 05/04/19 0153    sodium chloride flush 0.9 % injection 10 mL  10 mL Intravenous BID Ttao Garret, DO        iopamidol (ISOVUE-370) 76 % injection 80 mL  80 mL Intravenous ONCE PRN Tato Garret, DO        sodium chloride flush 0.9 % injection 10 mL  10 mL Intravenous ONCE PRN Tato Garret, DO        piperacillin-tazobactam (ZOSYN) 3.375 g in dextrose 50 mL IVPB (premix)  3.375 g Intravenous Once Tato Garret,  mL/hr at 05/04/19 0244 3.375 g at 05/04/19 0244     Current Outpatient Medications   Medication Sig Dispense Refill    levothyroxine (SYNTHROID) 50 MCG tablet Take 50 mcg by mouth Daily        No Known Allergies  Current Facility-Administered Medications   Medication Dose Route Frequency Provider Last Rate Last Dose    ondansetron (ZOFRAN) injection 4 mg  4 mg Intravenous Q30 Min PRN Tato Garret, DO   4 mg at 05/04/19 0030    0.9 % sodium chloride bolus  1,000 mL Intravenous Once Tato Garret, DO 1,000 mL/hr at 05/04/19 0244 1,000 mL at 05/04/19 0244    fentaNYL (SUBLIMAZE) injection 50 mcg  50 mcg Intravenous Q1H PRN Nasir Gordon, DO   50 mcg at 05/04/19 0153  sodium chloride flush 0.9 % injection 10 mL  10 mL Intravenous BID Jesenia Mishra, DO        iopamidol (ISOVUE-370) 76 % injection 80 mL  80 mL Intravenous ONCE PRN Jesenia Mishra, DO        sodium chloride flush 0.9 % injection 10 mL  10 mL Intravenous ONCE PRN Jesenia Mishra, DO        piperacillin-tazobactam (ZOSYN) 3.375 g in dextrose 50 mL IVPB (premix)  3.375 g Intravenous Once Jesenia Mishra,  mL/hr at 05/04/19 0244 3.375 g at 05/04/19 0244     Current Outpatient Medications   Medication Sig Dispense Refill    levothyroxine (SYNTHROID) 50 MCG tablet Take 50 mcg by mouth Daily         Nursing Notes Reviewed    VITAL SIGNS:  ED Triage Vitals [05/04/19 0007]   Enc Vitals Group      /69      Pulse 94      Resp 15      Temp 98.3 °F (36.8 °C)      Temp Source Oral      SpO2 100 %      Weight 133 lb (60.3 kg)      Height 5' 5\" (1.651 m)      Head Circumference       Peak Flow       Pain Score       Pain Loc       Pain Edu? Excl. in [address]? PHYSICAL EXAM:  Physical Exam   Constitutional: He is oriented to person, place, and time. He appears well-developed and well-nourished. He is active and cooperative. Non-toxic appearance. He does not have a sickly appearance. He appears ill. No distress. HENT:   Head: Normocephalic and atraumatic. Right Ear: External ear normal.   Left Ear: External ear normal.   Mouth/Throat: Oropharynx is clear and moist. No oropharyngeal exudate. Eyes: Pupils are equal, round, and reactive to light. Conjunctivae and EOM are normal. Right eye exhibits no discharge. Left eye exhibits no discharge. No scleral icterus. Neck: Normal range of motion. No JVD present. Cardiovascular: Normal rate, regular rhythm, normal heart sounds and intact distal pulses. Exam reveals no gallop and no friction rub. No murmur heard. Pulmonary/Chest: Effort normal and breath sounds normal. No stridor. No respiratory distress. He has no wheezes. He has no rales.  He specific ST wave changes appreciated. Prior EKG to compare with was not available       MDM:    Patient here with right-sided rib pain abdominal pain nausea cough fatigue diaphoresis. History of stage III cancer normal chemoradiation has had fevers and chills recently. On arrival again diaphoretic and breathing fast has chest wall pain right side and abdominal pain. Given history of cancer and the symptoms I did do imaging of his chest and pelvis does have liver masses, pancreatic mass possible abscess otherwise his labs are negative for pulmonary embolism EKG and troponin are negative. I did talk to hospital medicine patient in for observation given antibiotics, pain control nausea control fluids. Otherwise stable. CLINICAL IMPRESSION:  Final diagnoses:   Flank pain   History of cancer   Liver mass   Pancreatic mass       (Please note that portions of this note may have been completed with a voice recognition program. Efforts were made to edit the dictations but occasionally words aremis-transcribed.)    DISPOSITION REFERRAL (if applicable):  No follow-up provider specified.     DISPOSITION MEDICATIONS (if applicable):  New Prescriptions    No medications on file          Floridalma Chavez, [facility],   05/04/19 1183

## 2019-05-04 NOTE — ED TRIAGE NOTES
Patient arrives to ED today via [facility] with complaints of worsening right flank pain. Patient states that he has had a fever and chills for a couple days prior to this, but has not had a fever today. Patient states that right flank pain started this am but got worse around 6pm. Denies trauma at this time.

## 2019-05-04 NOTE — PROGRESS NOTES
2. Hepatomegaly with interval appearance since 2017 of two dominant hypodense   masses measuring up to 14 cm and 8 cm.  Differential considerations include   metastatic disease or abscess. 3. Cystic mass identified in the region of the pancreatic head measuring 2.6   cm.  It is unclear if this lesion is pancreatic in origin or represents a   partially necrotic lymph node.  Evaluation is limited given the arterial   phase of imaging.  Comparison to any recent CT scans would be helpful. Dedicated pancreatic CT protocol can also be considered for further   evaluation.  Of note, no associated pancreatic ductal dilatation or atrophy. 4. No acute intrathoracic abnormality. 5. Emphysema. Recommend Hydration(note renal insufficiency) and then MRI abdomen for better evaluation and possible biopsy.     Full consult to follow    CHARISSE

## 2019-05-04 NOTE — ED NOTES
XR CHEST PORTABLE   Status: Final result   Order Providers     Authorizing Billing   DO Óscar Holly, DO          Signed by     Signed Date/Time  Phone Pager   SESSIONS, [address] 5/04/2019 01:49 [phone]    [facility]     Read Date Phone Pager   SESSIONS, OSCAR May 4, 2019 [phone]    Radiation Dose Estimates     No radiation information found for this patient   Narrative   EXAMINATION:   SINGLE XRAY VIEW OF THE CHEST       5/4/2019 1:05 am       COMPARISON:   None.       HISTORY:   ORDERING SYSTEM PROVIDED HISTORY: RIB PAIN/CANCER   TECHNOLOGIST PROVIDED HISTORY:   Reason for exam:->RIB PAIN/CANCER   Ordering Physician Provided Reason for Exam: RIB PAIN/CANCER   Acuity: Acute   Type of Exam: Initial       FINDINGS:   Low right lung volume with elevation of the right hemidiaphragm.  Right   greater than left basilar atelectasis.  No lobar consolidation.  No pleural   effusion or pneumothorax.  Borderline cardiomegaly.  Atherosclerotic thoracic   aorta.  Multilevel degenerative disc disease.           Impression   Low right lung volume with elevation of the right hemidiaphragm and right   greater than left basilar atelectasis.  No lobar consolidation.           Stacey Shah, RN  05/04/19 5252

## 2019-05-04 NOTE — ED NOTES
Patient given urinal and notified that urine needs obtained. Voices understanding.       Kellie Pi, RN  05/04/19 9019

## 2019-05-04 NOTE — H&P
Department of Internal Medicine  Hospitalist  Attending History and Physical      CHIEF COMPLAINT:  abd pain    Reason for Admission:  Liver mass    History Obtained From:  patient    HISTORY OF PRESENT ILLNESS:      The patient is a 63 y.o. male with significant past medical history of esophageal cncer who has finished chemo aug 22 and radiation sept 2017 and known liver mass who underwent a biopsy 1/18/19 and did not show any tumors cells and no abscess. He presents as he has been sick all week with vomitng and had the \"stomach flu\". Yesterday evening while eating dinner he had a sharp pain right upper quadrant. Has had mild temperature 100.3 a few days ago.   Cannot review other CT imaging to compare if masses are stable     Past Medical History:        Diagnosis Date    Cancer [facility])     -(path report 5/2017- metastatic high grade squamous cell ca- from lymph node right neck mass and right tonsil)- doing chemo and radiation Dr Magno Carson and Dr Lester Morillo done with chemo and radiation- per pt on 3/6/2018    Dysphagia     \"just started 8/7/2017- can not tolerate any solid foods- can drink water- protein drinks- baby food\" for peg tube insertion 8/16/2017( per pt on 3/6/2018 can eat pretty much anything now so taking the peg tube out)    Fear of needles     HX OTHER MEDICAL     \"passes out whenever they start an IV- takes Ativan and have him smell alcohol wipe- seems to help\"    Liver mass     \"found with my yearly check- for liver bx 1/17/2019    Prolonged emergence from general anesthesia     \"took awhile waking him up after last surgery\" per wife    Thyroid disease     \"just hypo thyroid\"     Past Surgical History:        Procedure Laterality Date    DENTAL SURGERY  1990's    \"wisdom teeth- put to sleep\"    GASTROSTOMY TUBE PLACEMENT  08/2017    removed 11/2017   [address]    yumiko ing hernia repair    LARYNGOSCOPY  05/25/2017    direct laryngoscopy with bx of right tonsil and exc of right cervical mass with Dr Nicole Amaya     IMedications Prior to Admission:    No current facility-administered medications on file prior to encounter. Current Outpatient Medications on File Prior to Encounter   Medication Sig Dispense Refill    levothyroxine (SYNTHROID) 50 MCG tablet Take 50 mcg by mouth Daily           Allergies:  Patient has no known allergies.     Social History:   Social History     Socioeconomic History    Marital status:      Spouse name: Not on file    Number of children: Not on file    Years of education: Not on file    Highest education level: Not on file   Occupational History    Not on file   Social Needs    Financial resource strain: Not on file    Food insecurity:     Worry: Not on file     Inability: Not on file    Transportation needs:     Medical: Not on file     Non-medical: Not on file   Tobacco Use    Smoking status: Current Some Day Smoker     Packs/day: 0.50     Years: 11.00     Pack years: 5.50     Types: Cigarettes     Last attempt to quit: 3/30/2017     Years since quittin.0    Smokeless tobacco: Never Used    Tobacco comment: per pt on 2018- 2018 started smoking one or 2 cigarettes per day   Substance and Sexual Activity    Alcohol use: No    Drug use: Yes     Types: Marijuana     Comment: last used 3/5/2018\"average one per night\"\"helps with my appetitie\"    Sexual activity: Not on file   Lifestyle    Physical activity:     Days per week: Not on file     Minutes per session: Not on file    Stress: Not on file   Relationships    Social connections:     Talks on phone: Not on file     Gets together: Not on file     Attends Taoism service: Not on file     Active member of club or organization: Not on file     Attends meetings of clubs or organizations: Not on file     Relationship status: Not on file    Intimate partner violence:     Fear of current or ex partner: Not on file     Emotionally abused: Not on file     Physically abused: Not on file Forced sexual activity: Not on file   Other Topics Concern    Not on file   Social History Narrative    Not on file   '      Family History:   Family History   Problem Relation Age of Onset    Cancer Mother         brain tumor    Cancer Father         throat cancer       REVIEW OF SYSTEMS:  CONSTITUTIONAL:  positive for  malaise  EYES:  negative  HEENT:  negative  RESPIRATORY:  negative  CARDIOVASCULAR:  negative  GASTROINTESTINAL:  positive for abdominal pain  GENITOURINARY:  negative  ENDOCRINE:  negative  MUSCULOSKELETAL:  negative  NEUROLOGICAL:  negative  BEHAVIOR/PSYCH:  negative  PHYSICAL EXAM:    Vitals:  /85   Pulse 91   Temp 98.3 °F (36.8 °C) (Oral)   Resp (!) 37   Ht 5' 5\" (1.651 m)   Wt 133 lb (60.3 kg)   SpO2 96%   BMI 22.13 kg/m²     CONSTITUTIONAL:  awake  EYES:  Lids and lashes normal, pupils equal, round and reactive to light, extra ocular muscles intact, sclera clear, conjunctiva normal  ENT:  Normocephalic, without obvious abnormality, atraumatic, sinuses nontender on palpation, external ears without lesions, oral pharynx with moist mucus membranes, tonsils without erythema or exudates, gums normal and good dentition. LUNGS:  tachypneic due to pain  CARDIOVASCULAR:  Normal apical impulse, regular rate and rhythm, normal S1 and S2, no S3 or S4, and no murmur noted  ABDOMEN:  Soft, tender RUQ  MUSCULOSKELETAL:  there is no redness, warmth, or swelling of the joints  NEUROLOGIC:  Awake, alert, oriented to name, place and time. Cranial nerves II-XII are grossly intact. Motor is 5 out of 5 bilaterally. SKIN:  no bruising or bleeding    DATA:  CXR  Low right lung volume with elevation of the right hemidiaphragm and right   greater than left basilar atelectasis.  No lobar consolidation.         CT chest/abd  No acute vascular injury including dissection. 2. Hepatomegaly with interval appearance since 2017 of 2 dominant hypodense   masses measuring up to 14 cm and 8 cm.  Differential considerations include   metastatic disease or abscess. 3. Cystic mass identified in the region of the pancreatic head measuring 2.6   cm.  It is unclear if this lesion is pancreatic in origin or represents a   partially necrotic lymph node.  Evaluation is limited given the arterial   phase of imaging.  Comparison to any recent CT scans would be helpful. Dedicated pancreatic CT protocol can also be considered for further   evaluation.  Of note, no associated pancreatic ductal dilatation or atrophy. 4. No acute intrathoracic abnormality. 5. Emphysema.    EKG 82 SR    ASSESSMENT AND PLAN:    Abd pain with Hepatomegaly with hepatic and pancreatic mass with hx of esophageal cancer  -consult onc  -had recent liver biopsy and will need to discuss with onc whether another one will be beneficial and also cannot compare  previous CT imaging with prior  -had recent liver biopsy in January for liver mass and no tumor cells or abscess on cytology  -IVF, IV morphine  -received a dose of zoysn in ER and held further abx as had recent liver biopsy with no abscess  Acute kidney injury  -IVF  Hypothyroid  -synthroid  DVT prophylaxis  -lovenox

## 2019-05-04 NOTE — ED NOTES
[id number] assigned/clean @ [address] [facility] notified.      Han Sprague  05/04/19 0259

## 2019-05-04 NOTE — PROGRESS NOTES
Pt with h/o esophageal cancer, was admitted with abd pain, found to have abd mass, oncology consult pending. Pt state still in pain, no nausea, no F/C.

## 2019-05-04 NOTE — PLAN OF CARE
Problem: Falls - Risk of:  Goal: Will remain free from falls  Description  Will remain free from falls  Outcome: Ongoing  Goal: Absence of physical injury  Description  Absence of physical injury  Outcome: Ongoing     Problem: Infection:  Goal: Will remain free from infection  Description  Will remain free from infection  Outcome: Ongoing     Problem: Safety:  Goal: Free from accidental physical injury  Description  Free from accidental physical injury  Outcome: Ongoing  Goal: Free from intentional harm  Description  Free from intentional harm  Outcome: Ongoing     Problem: Daily Care:  Goal: Daily care needs are met  Description  Daily care needs are met  Outcome: Ongoing     Problem: Pain:  Description  Pain management should include both nonpharmacologic and pharmacologic interventions.   Goal: Patient's pain/discomfort is manageable  Description  Patient's pain/discomfort is manageable  Outcome: Ongoing  Goal: Pain level will decrease  Description  Pain level will decrease  Outcome: Ongoing  Goal: Control of acute pain  Description  Control of acute pain  Outcome: Ongoing  Goal: Control of chronic pain  Description  Control of chronic pain  Outcome: Ongoing     Problem: Skin Integrity:  Goal: Skin integrity will stabilize  Description  Skin integrity will stabilize  Outcome: Ongoing     Problem: Discharge Planning:  Goal: Patients continuum of care needs are met  Description  Patients continuum of care needs are met  Outcome: Ongoing

## 2019-05-05 LAB
ALBUMIN SERPL-MCNC: 3.2 GM/DL (ref 3.4–5)
ALP BLD-CCNC: 279 IU/L (ref 40–129)
ALT SERPL-CCNC: 20 U/L (ref 10–40)
ANION GAP SERPL CALCULATED.3IONS-SCNC: 13 MMOL/L (ref 4–16)
AST SERPL-CCNC: 41 IU/L (ref 15–37)
BILIRUB SERPL-MCNC: 0.7 MG/DL (ref 0–1)
BUN BLDV-MCNC: 19 MG/DL (ref 6–23)
CALCIUM SERPL-MCNC: 8.8 MG/DL (ref 8.3–10.6)
CHLORIDE BLD-SCNC: 103 MMOL/L (ref 99–110)
CO2: 22 MMOL/L (ref 21–32)
CREAT SERPL-MCNC: 1.2 MG/DL (ref 0.9–1.3)
CULTURE: NORMAL
GFR AFRICAN AMERICAN: >60 ML/MIN/1.73M2
GFR NON-AFRICAN AMERICAN: >60 ML/MIN/1.73M2
GLUCOSE BLD-MCNC: 89 MG/DL (ref 70–99)
HCT VFR BLD CALC: 31.3 % (ref 42–52)
HEMOGLOBIN: 9.2 GM/DL (ref 13.5–18)
Lab: NORMAL
MCH RBC QN AUTO: 28.8 PG (ref 27–31)
MCHC RBC AUTO-ENTMCNC: 29.4 % (ref 32–36)
MCV RBC AUTO: 98.1 FL (ref 78–100)
PDW BLD-RTO: 13.5 % (ref 11.7–14.9)
PLATELET # BLD: 294 K/CU MM (ref 140–440)
PMV BLD AUTO: 10 FL (ref 7.5–11.1)
POTASSIUM SERPL-SCNC: 4.4 MMOL/L (ref 3.5–5.1)
RBC # BLD: 3.19 M/CU MM (ref 4.6–6.2)
SODIUM BLD-SCNC: 138 MMOL/L (ref 135–145)
SPECIMEN: NORMAL
TOTAL PROTEIN: 6.1 GM/DL (ref 6.4–8.2)
WBC # BLD: 12.7 K/CU MM (ref 4–10.5)

## 2019-05-05 PROCEDURE — 6360000002 HC RX W HCPCS: Performed by: HOSPITALIST

## 2019-05-05 PROCEDURE — 6370000000 HC RX 637 (ALT 250 FOR IP): Performed by: HOSPITALIST

## 2019-05-05 PROCEDURE — 1200000000 HC SEMI PRIVATE

## 2019-05-05 PROCEDURE — 36415 COLL VENOUS BLD VENIPUNCTURE: CPT

## 2019-05-05 PROCEDURE — 2580000003 HC RX 258: Performed by: HOSPITALIST

## 2019-05-05 PROCEDURE — 85027 COMPLETE CBC AUTOMATED: CPT

## 2019-05-05 PROCEDURE — 80053 COMPREHEN METABOLIC PANEL: CPT

## 2019-05-05 PROCEDURE — 2580000003 HC RX 258: Performed by: INTERNAL MEDICINE

## 2019-05-05 PROCEDURE — 2580000003 HC RX 258

## 2019-05-05 RX ADMIN — ONDANSETRON 4 MG: 2 INJECTION INTRAMUSCULAR; INTRAVENOUS at 05:55

## 2019-05-05 RX ADMIN — LEVOTHYROXINE SODIUM 50 MCG: 50 TABLET ORAL at 05:46

## 2019-05-05 RX ADMIN — OXYCODONE HYDROCHLORIDE 5 MG: 5 TABLET ORAL at 17:37

## 2019-05-05 RX ADMIN — ONDANSETRON 4 MG: 2 INJECTION INTRAMUSCULAR; INTRAVENOUS at 21:35

## 2019-05-05 RX ADMIN — OXYCODONE HYDROCHLORIDE 5 MG: 5 TABLET ORAL at 05:46

## 2019-05-05 RX ADMIN — SODIUM CHLORIDE: 9 INJECTION, SOLUTION INTRAVENOUS at 05:46

## 2019-05-05 RX ADMIN — ENOXAPARIN SODIUM 40 MG: 40 INJECTION SUBCUTANEOUS at 10:49

## 2019-05-05 RX ADMIN — OXYCODONE HYDROCHLORIDE 5 MG: 5 TABLET ORAL at 10:49

## 2019-05-05 RX ADMIN — SODIUM CHLORIDE: 9 INJECTION, SOLUTION INTRAVENOUS at 15:45

## 2019-05-05 ASSESSMENT — PAIN DESCRIPTION - PROGRESSION
CLINICAL_PROGRESSION: GRADUALLY IMPROVING
CLINICAL_PROGRESSION: NOT CHANGED
CLINICAL_PROGRESSION: GRADUALLY IMPROVING
CLINICAL_PROGRESSION: NOT CHANGED
CLINICAL_PROGRESSION: NOT CHANGED
CLINICAL_PROGRESSION: GRADUALLY IMPROVING
CLINICAL_PROGRESSION: NOT CHANGED
CLINICAL_PROGRESSION: GRADUALLY IMPROVING
CLINICAL_PROGRESSION: NOT CHANGED
CLINICAL_PROGRESSION: GRADUALLY IMPROVING
CLINICAL_PROGRESSION: NOT CHANGED
CLINICAL_PROGRESSION: GRADUALLY IMPROVING
CLINICAL_PROGRESSION: GRADUALLY IMPROVING

## 2019-05-05 ASSESSMENT — PAIN DESCRIPTION - ONSET
ONSET: ON-GOING
ONSET: ON-GOING

## 2019-05-05 ASSESSMENT — PAIN SCALES - GENERAL
PAINLEVEL_OUTOF10: 6
PAINLEVEL_OUTOF10: 7
PAINLEVEL_OUTOF10: 3
PAINLEVEL_OUTOF10: 8
PAINLEVEL_OUTOF10: 5
PAINLEVEL_OUTOF10: 4

## 2019-05-05 ASSESSMENT — PAIN DESCRIPTION - DESCRIPTORS
DESCRIPTORS: ACHING
DESCRIPTORS: ACHING

## 2019-05-05 ASSESSMENT — PAIN DESCRIPTION - FREQUENCY
FREQUENCY: INTERMITTENT
FREQUENCY: CONTINUOUS

## 2019-05-05 ASSESSMENT — PAIN DESCRIPTION - LOCATION
LOCATION: RIB CAGE
LOCATION: RIB CAGE

## 2019-05-05 ASSESSMENT — PAIN DESCRIPTION - ORIENTATION
ORIENTATION: RIGHT
ORIENTATION: RIGHT

## 2019-05-05 ASSESSMENT — PAIN DESCRIPTION - PAIN TYPE
TYPE: CHRONIC PAIN
TYPE: CHRONIC PAIN

## 2019-05-05 NOTE — PROGRESS NOTES
Hospitalist Progress Note      Name:  Louis Oliva /Age/Sex: 1960  (63 y.o. male)   MRN & CSN:  [id number] & [id number] Admission Date/Time: 2019 12:07 AM   Location:  [address] [place] [address]- PCP: No primary care provider on file. Hospital Day: 2    Assessment and Plan:   Louis Oliva is a 63 y.o.  male  who presents with <principal problem not specified>    Abd pain with Hepatomegaly with hepatic and pancreatic mass with hx of esophageal cancer  -consult onc  -had recent liver biopsy and will need to discuss with onc whether another one will be beneficial and also cannot compare  previous CT imaging with prior  -had recent liver biopsy in January for liver mass and no tumor cells or abscess on cytology  -IVF, IV morphine  -received a dose of zoysn in ER and held further abx as had recent liver biopsy with no abscess  - MRI abd with contrast.     Acute kidney injury  -IVF, back to baseline. Hypothyroid  -synthroid    Diet DIET CLEAR LIQUID;   DVT Prophylaxis ? Lovenox,   Code Status Full Code   Disposition  pending clinical improvement and completing workup       History of Present Illness:     Pt S&E.     abd pain improved, no chest pain, no dyspnea, no F/C.    10-14 point ROS reviewed negative, unless as noted above    Objective: Intake/Output Summary (Last 24 hours) at 2019 0859  Last data filed at 2019 1848  Gross per 24 hour   Intake 1532 ml   Output 950 ml   Net 582 ml      Vitals:   Vitals:    19 0539   BP: (!) 117/59   Pulse: 88   Resp: 18   Temp: 98.9 °F (37.2 °C)   SpO2: 93%     Physical Exam:    GEN Awake male, cooperative, no apparent distress. RESP Clear to auscultation, no wheezes, rales or rhonchi. Symmetric chest movement . CARDIO/VASC S1/S2 auscultated. Regular rate. GI Abdomen is soft , right sided tenderness with palp, Bowel sounds are normoactive. MSK No gross joint deformities.  Spontaneous movement of all extremities  SKIN Normal coloration, warm, dry.  NEURO normal speech, no lateralizing weakness. PSYCH Awake, alert, oriented x 4. Affect appropriate.     Medications:   Medications:    sodium chloride flush  10 mL Intravenous BID    levothyroxine  50 mcg Oral Daily    sodium chloride flush  10 mL Intravenous 2 times per day    enoxaparin  40 mg Subcutaneous Daily      Infusions:    sodium chloride 125 mL/hr at 05/05/19 0546     PRN Meds:   iopamidol 80 mL ONCE PRN   sodium chloride flush 10 mL ONCE PRN   sodium chloride flush 10 mL PRN   magnesium hydroxide 30 mL Daily PRN   ondansetron 4 mg Q6H PRN   morphine 4 mg Q4H PRN   oxyCODONE 5 mg Q4H PRN         Electronically signed by Princess Tovar MD on 5/5/2019 at 8:59 AM

## 2019-05-05 NOTE — PLAN OF CARE
Problem: Falls - Risk of:  Goal: Will remain free from falls  Description  Will remain free from falls  5/5/2019 0046 by Topher Saxena RN  Outcome: Ongoing  5/4/2019 1654 by Bigg Clayton RN  Outcome: Ongoing  Goal: Absence of physical injury  Description  Absence of physical injury  5/5/2019 0046 by Topher Saxena RN  Outcome: Ongoing  5/4/2019 1654 by Bigg Clayton RN  Outcome: Ongoing     Problem: Infection:  Goal: Will remain free from infection  Description  Will remain free from infection  5/5/2019 0046 by Topher Saxena RN  Outcome: Ongoing  5/4/2019 1654 by Bigg Clayton RN  Outcome: Ongoing     Problem: Safety:  Goal: Free from accidental physical injury  Description  Free from accidental physical injury  5/5/2019 0046 by Topher Saxena RN  Outcome: Ongoing  5/4/2019 1654 by Bigg Clayton RN  Outcome: Ongoing  Goal: Free from intentional harm  Description  Free from intentional harm  5/5/2019 0046 by Topher Saxena RN  Outcome: Ongoing  5/4/2019 1654 by Bigg Clayton RN  Outcome: Ongoing     Problem: Daily Care:  Goal: Daily care needs are met  Description  Daily care needs are met  5/5/2019 0046 by Topher Saxena RN  Outcome: Ongoing  5/4/2019 1654 by Bigg Clayton RN  Outcome: Ongoing     Problem: Pain:  Goal: Patient's pain/discomfort is manageable  Description  Patient's pain/discomfort is manageable  5/5/2019 0046 by Topher Saxena RN  Outcome: Ongoing  5/4/2019 1654 by Bigg Clayton RN  Outcome: Ongoing  Goal: Pain level will decrease  Description  Pain level will decrease  5/5/2019 0046 by Topher Saxena RN  Outcome: Ongoing  5/4/2019 1654 by Bigg Clayton RN  Outcome: Ongoing  Goal: Control of acute pain  Description  Control of acute pain  5/5/2019 0046 by Topher Saxena RN  Outcome: Ongoing  5/4/2019 1654 by Bigg Clayton RN  Outcome: Ongoing  Goal: Control of chronic pain  Description  Control of chronic pain  5/5/2019

## 2019-05-06 ENCOUNTER — APPOINTMENT (OUTPATIENT)
Dept: MRI IMAGING | Age: 59
DRG: 442 | End: 2019-05-06
Payer: COMMERCIAL

## 2019-05-06 LAB
ALBUMIN SERPL-MCNC: 3.4 GM/DL (ref 3.4–5)
ALP BLD-CCNC: 279 IU/L (ref 40–128)
ALT SERPL-CCNC: 20 U/L (ref 10–40)
ANION GAP SERPL CALCULATED.3IONS-SCNC: 13 MMOL/L (ref 4–16)
AST SERPL-CCNC: 48 IU/L (ref 15–37)
BILIRUB SERPL-MCNC: 0.7 MG/DL (ref 0–1)
BUN BLDV-MCNC: 15 MG/DL (ref 6–23)
CALCIUM SERPL-MCNC: 9 MG/DL (ref 8.3–10.6)
CHLORIDE BLD-SCNC: 103 MMOL/L (ref 99–110)
CO2: 22 MMOL/L (ref 21–32)
CREAT SERPL-MCNC: 1 MG/DL (ref 0.9–1.3)
FERRITIN: 690 NG/ML (ref 30–400)
FOLATE: >20 NG/ML (ref 3.1–17.5)
GFR AFRICAN AMERICAN: >60 ML/MIN/1.73M2
GFR NON-AFRICAN AMERICAN: >60 ML/MIN/1.73M2
GLUCOSE BLD-MCNC: 95 MG/DL (ref 70–99)
IRON: 26 UG/DL (ref 59–158)
PCT TRANSFERRIN: 12 % (ref 10–44)
POTASSIUM SERPL-SCNC: 4.1 MMOL/L (ref 3.5–5.1)
SODIUM BLD-SCNC: 138 MMOL/L (ref 135–145)
T4 FREE: 1.02 NG/DL (ref 0.9–1.8)
TOTAL IRON BINDING CAPACITY: 225 UG/DL (ref 250–450)
TOTAL PROTEIN: 5.9 GM/DL (ref 6.4–8.2)
TSH HIGH SENSITIVITY: 17.39 UIU/ML (ref 0.27–4.2)
UNSATURATED IRON BINDING CAPACITY: 199 UG/DL (ref 110–370)
VITAMIN B-12: 1373 PG/ML (ref 211–911)

## 2019-05-06 PROCEDURE — 6370000000 HC RX 637 (ALT 250 FOR IP): Performed by: HOSPITALIST

## 2019-05-06 PROCEDURE — 2580000003 HC RX 258: Performed by: HOSPITALIST

## 2019-05-06 PROCEDURE — 83540 ASSAY OF IRON: CPT

## 2019-05-06 PROCEDURE — 94761 N-INVAS EAR/PLS OXIMETRY MLT: CPT

## 2019-05-06 PROCEDURE — 74181 MRI ABDOMEN W/O CONTRAST: CPT

## 2019-05-06 PROCEDURE — 82746 ASSAY OF FOLIC ACID SERUM: CPT

## 2019-05-06 PROCEDURE — 84439 ASSAY OF FREE THYROXINE: CPT

## 2019-05-06 PROCEDURE — 36415 COLL VENOUS BLD VENIPUNCTURE: CPT

## 2019-05-06 PROCEDURE — 84443 ASSAY THYROID STIM HORMONE: CPT

## 2019-05-06 PROCEDURE — 1200000000 HC SEMI PRIVATE

## 2019-05-06 PROCEDURE — 82728 ASSAY OF FERRITIN: CPT

## 2019-05-06 PROCEDURE — A9577 INJ MULTIHANCE: HCPCS | Performed by: INTERNAL MEDICINE

## 2019-05-06 PROCEDURE — 6360000002 HC RX W HCPCS: Performed by: HOSPITALIST

## 2019-05-06 PROCEDURE — 74183 MRI ABD W/O CNTR FLWD CNTR: CPT

## 2019-05-06 PROCEDURE — 83550 IRON BINDING TEST: CPT

## 2019-05-06 PROCEDURE — 6360000004 HC RX CONTRAST MEDICATION: Performed by: INTERNAL MEDICINE

## 2019-05-06 PROCEDURE — 80053 COMPREHEN METABOLIC PANEL: CPT

## 2019-05-06 PROCEDURE — 82607 VITAMIN B-12: CPT

## 2019-05-06 PROCEDURE — 2580000003 HC RX 258: Performed by: EMERGENCY MEDICINE

## 2019-05-06 PROCEDURE — 93010 ELECTROCARDIOGRAM REPORT: CPT | Performed by: INTERNAL MEDICINE

## 2019-05-06 RX ORDER — LEVOTHYROXINE SODIUM 0.1 MG/1
100 TABLET ORAL DAILY
Status: DISCONTINUED | OUTPATIENT
Start: 2019-05-07 | End: 2019-05-11 | Stop reason: HOSPADM

## 2019-05-06 RX ORDER — POLYETHYLENE GLYCOL 3350 17 G/17G
17 POWDER, FOR SOLUTION ORAL DAILY
Status: DISCONTINUED | OUTPATIENT
Start: 2019-05-07 | End: 2019-05-11 | Stop reason: HOSPADM

## 2019-05-06 RX ADMIN — SODIUM CHLORIDE, PRESERVATIVE FREE 10 ML: 5 INJECTION INTRAVENOUS at 20:04

## 2019-05-06 RX ADMIN — ONDANSETRON 4 MG: 2 INJECTION INTRAMUSCULAR; INTRAVENOUS at 18:34

## 2019-05-06 RX ADMIN — GADOBENATE DIMEGLUMINE 13 ML: 529 INJECTION, SOLUTION INTRAVENOUS at 14:42

## 2019-05-06 RX ADMIN — ENOXAPARIN SODIUM 40 MG: 40 INJECTION SUBCUTANEOUS at 09:14

## 2019-05-06 RX ADMIN — SODIUM CHLORIDE: 9 INJECTION, SOLUTION INTRAVENOUS at 22:49

## 2019-05-06 RX ADMIN — LEVOTHYROXINE SODIUM 50 MCG: 50 TABLET ORAL at 06:47

## 2019-05-06 RX ADMIN — OXYCODONE HYDROCHLORIDE 5 MG: 5 TABLET ORAL at 11:30

## 2019-05-06 RX ADMIN — OXYCODONE HYDROCHLORIDE 5 MG: 5 TABLET ORAL at 06:47

## 2019-05-06 RX ADMIN — MAGNESIUM HYDROXIDE 30 ML: 400 SUSPENSION ORAL at 18:34

## 2019-05-06 ASSESSMENT — PAIN DESCRIPTION - PROGRESSION
CLINICAL_PROGRESSION: GRADUALLY IMPROVING

## 2019-05-06 ASSESSMENT — PAIN SCALES - GENERAL
PAINLEVEL_OUTOF10: 10
PAINLEVEL_OUTOF10: 0
PAINLEVEL_OUTOF10: 0
PAINLEVEL_OUTOF10: 7

## 2019-05-06 NOTE — PLAN OF CARE
Problem: Falls - Risk of:  Goal: Will remain free from falls  Description  Will remain free from falls  5/5/2019 2323 by Molly Watkins RN  Outcome: Ongoing  5/5/2019 1423 by Ingrid Hidalgo RN  Outcome: Ongoing  Goal: Absence of physical injury  Description  Absence of physical injury  5/5/2019 2323 by Molly Watkins RN  Outcome: Ongoing  5/5/2019 1423 by Ingrid Hidalgo RN  Outcome: Ongoing     Problem: Infection:  Goal: Will remain free from infection  Description  Will remain free from infection  5/5/2019 2323 by Molly Watkins RN  Outcome: Ongoing  5/5/2019 1423 by Ingrid Hidalgo RN  Outcome: Ongoing     Problem: Safety:  Goal: Free from accidental physical injury  Description  Free from accidental physical injury  5/5/2019 2323 by Molly Watkins RN  Outcome: Ongoing  5/5/2019 1423 by Ingrid Hidalgo RN  Outcome: Ongoing  Goal: Free from intentional harm  Description  Free from intentional harm  5/5/2019 2323 by Molly Watkins RN  Outcome: Ongoing  5/5/2019 1423 by Ingrid Hidalgo RN  Outcome: Ongoing     Problem: Daily Care:  Goal: Daily care needs are met  Description  Daily care needs are met  5/5/2019 2323 by Molly Watkins RN  Outcome: Ongoing  5/5/2019 1423 by Ingrid Hidalgo RN  Outcome: Ongoing     Problem: Pain:  Goal: Patient's pain/discomfort is manageable  Description  Patient's pain/discomfort is manageable  5/5/2019 2323 by Molly Watkins RN  Outcome: Ongoing  5/5/2019 1423 by Ingrid Hidalgo RN  Outcome: Ongoing  Goal: Pain level will decrease  Description  Pain level will decrease  5/5/2019 2323 by Molly Watkins RN  Outcome: Ongoing  5/5/2019 1423 by Ingrid Hidalgo RN  Outcome: Ongoing  Goal: Control of acute pain  Description  Control of acute pain  5/5/2019 2323 by Molly Watkins RN  Outcome: Ongoing  5/5/2019 1423 by Ingrid Hidalgo RN  Outcome: Ongoing  Goal: Control of chronic pain  Description  Control of chronic pain  5/5/2019 2323 by Topher Saxena RN  Outcome: Ongoing  5/5/2019 1423 by Bigg Clayton RN  Outcome: Ongoing     Problem: Skin Integrity:  Goal: Skin integrity will stabilize  Description  Skin integrity will stabilize  5/5/2019 2323 by Topher Saxena RN  Outcome: Ongoing  5/5/2019 1423 by Bigg Clayton RN  Outcome: Ongoing     Problem: Discharge Planning:  Goal: Patients continuum of care needs are met  Description  Patients continuum of care needs are met  5/5/2019 2323 by Topher Saxena RN  Outcome: Ongoing  5/5/2019 1423 by Bigg Clayton RN  Outcome: Ongoing

## 2019-05-06 NOTE — PROGRESS NOTES
Hospitalist Progress Note      Name:  Mary Vásquez /Age/Sex: 1960  (63 y.o. male)   MRN & CSN:  [id number] & [id number] Admission Date/Time: 2019 12:07 AM   Location:  [address] [place] [address]- PCP: No primary care provider on file. Hospital Day: 3    Assessment and Plan:   Mary Vásquez is a 63 y.o.  male  who presents with <principal problem not specified>    Abd pain with Hepatomegaly with hepatic and pancreatic mass with hx of esophageal cancer  -consult onc  -had recent liver biopsy and will need to discuss with onc whether another one will be beneficial and also cannot compare  previous CT imaging with prior  -had recent liver biopsy in January for liver mass and no tumor cells or abscess on cytology  -IVF, IV morphine  -received a dose of zoysn in ER and held further abx as had recent liver biopsy with no abscess  - MRI abd with contrast.     > h/o Hypothyroidism  > Elevated TSH  - on synthroid  - consult Endo    Acute kidney injury  -IVF, back to baseline. Hypothyroid  -synthroid    Diet Dietary Nutrition Supplements: Standard High Calorie Oral Supplement  DIET GENERAL;   DVT Prophylaxis ? Lovenox,   Code Status Full Code   Disposition  pending clinical improvement and completing workup       History of Present Illness:     Pt S&E. Reports improving symptoms,     10-14 point ROS reviewed negative, unless as noted above    Objective: Intake/Output Summary (Last 24 hours) at 2019 0754  Last data filed at 2019 1900  Gross per 24 hour   Intake 2145 ml   Output 1225 ml   Net 920 ml      Vitals:   Vitals:    19 0510   BP: 136/82   Pulse: 97   Resp: 17   Temp: 99 °F (37.2 °C)   SpO2: 94%     Physical Exam:    GEN Awake male, cooperative, no apparent distress. RESP Clear to auscultation, no wheezes, rales or rhonchi. Symmetric chest movement . CARDIO/VASC S1/S2 auscultated. Regular rate.    GI Abdomen is soft , right sided tenderness with palp, Bowel sounds are normoactive. MSK No gross joint deformities. Spontaneous movement of all extremities  SKIN Normal coloration, warm, dry. NEURO normal speech, no lateralizing weakness. PSYCH Awake, alert, oriented x 4. Affect appropriate.     Medications:   Medications:    sodium chloride flush  10 mL Intravenous BID    levothyroxine  50 mcg Oral Daily    sodium chloride flush  10 mL Intravenous 2 times per day    enoxaparin  40 mg Subcutaneous Daily      Infusions:    sodium chloride 75 mL/hr at 05/05/19 1545     PRN Meds:     iopamidol 80 mL ONCE PRN   sodium chloride flush 10 mL ONCE PRN   sodium chloride flush 10 mL PRN   magnesium hydroxide 30 mL Daily PRN   ondansetron 4 mg Q6H PRN   morphine 4 mg Q4H PRN   oxyCODONE 5 mg Q4H PRN         Electronically signed by Blanka López MD on 5/6/2019 at 7:54 AM

## 2019-05-06 NOTE — CARE COORDINATION
Met c pt and spouse to initiate discharge planning. Pt lives at home with spouse who is available to help pt as needed. Pt denied discharge needs, pt has pcp/ active insurance and can afford meds. Pt advised CM available if needs arise.

## 2019-05-07 ENCOUNTER — APPOINTMENT (OUTPATIENT)
Dept: ULTRASOUND IMAGING | Age: 59
DRG: 442 | End: 2019-05-07
Payer: COMMERCIAL

## 2019-05-07 LAB — T4 FREE: 1.14 NG/DL (ref 0.9–1.8)

## 2019-05-07 PROCEDURE — 2580000003 HC RX 258: Performed by: EMERGENCY MEDICINE

## 2019-05-07 PROCEDURE — 86800 THYROGLOBULIN ANTIBODY: CPT

## 2019-05-07 PROCEDURE — 2580000003 HC RX 258: Performed by: HOSPITALIST

## 2019-05-07 PROCEDURE — 6370000000 HC RX 637 (ALT 250 FOR IP): Performed by: HOSPITALIST

## 2019-05-07 PROCEDURE — 6370000000 HC RX 637 (ALT 250 FOR IP): Performed by: NURSE PRACTITIONER

## 2019-05-07 PROCEDURE — 94761 N-INVAS EAR/PLS OXIMETRY MLT: CPT

## 2019-05-07 PROCEDURE — 6360000002 HC RX W HCPCS: Performed by: HOSPITALIST

## 2019-05-07 PROCEDURE — 76536 US EXAM OF HEAD AND NECK: CPT

## 2019-05-07 PROCEDURE — 84439 ASSAY OF FREE THYROXINE: CPT

## 2019-05-07 PROCEDURE — 6370000000 HC RX 637 (ALT 250 FOR IP): Performed by: INTERNAL MEDICINE

## 2019-05-07 PROCEDURE — 36415 COLL VENOUS BLD VENIPUNCTURE: CPT

## 2019-05-07 PROCEDURE — 86376 MICROSOMAL ANTIBODY EACH: CPT

## 2019-05-07 PROCEDURE — 1200000000 HC SEMI PRIVATE

## 2019-05-07 RX ORDER — BISACODYL 10 MG
10 SUPPOSITORY, RECTAL RECTAL DAILY PRN
Status: DISCONTINUED | OUTPATIENT
Start: 2019-05-07 | End: 2019-05-11 | Stop reason: HOSPADM

## 2019-05-07 RX ORDER — DOCUSATE SODIUM 100 MG/1
100 CAPSULE, LIQUID FILLED ORAL 2 TIMES DAILY
Status: DISCONTINUED | OUTPATIENT
Start: 2019-05-07 | End: 2019-05-11 | Stop reason: HOSPADM

## 2019-05-07 RX ADMIN — SODIUM CHLORIDE: 9 INJECTION, SOLUTION INTRAVENOUS at 14:51

## 2019-05-07 RX ADMIN — LEVOTHYROXINE SODIUM 100 MCG: 100 TABLET ORAL at 05:44

## 2019-05-07 RX ADMIN — ONDANSETRON 4 MG: 2 INJECTION INTRAMUSCULAR; INTRAVENOUS at 23:05

## 2019-05-07 RX ADMIN — DOCUSATE SODIUM 100 MG: 100 CAPSULE, LIQUID FILLED ORAL at 20:14

## 2019-05-07 RX ADMIN — POLYETHYLENE GLYCOL (3350) 17 G: 17 POWDER, FOR SOLUTION ORAL at 10:13

## 2019-05-07 RX ADMIN — SODIUM CHLORIDE, PRESERVATIVE FREE 10 ML: 5 INJECTION INTRAVENOUS at 20:14

## 2019-05-07 RX ADMIN — SODIUM CHLORIDE, PRESERVATIVE FREE 10 ML: 5 INJECTION INTRAVENOUS at 20:15

## 2019-05-07 RX ADMIN — OXYCODONE HYDROCHLORIDE 5 MG: 5 TABLET ORAL at 16:45

## 2019-05-07 RX ADMIN — OXYCODONE HYDROCHLORIDE 5 MG: 5 TABLET ORAL at 05:43

## 2019-05-07 ASSESSMENT — PAIN DESCRIPTION - LOCATION: LOCATION: RIB CAGE

## 2019-05-07 ASSESSMENT — PAIN - FUNCTIONAL ASSESSMENT: PAIN_FUNCTIONAL_ASSESSMENT: PREVENTS OR INTERFERES SOME ACTIVE ACTIVITIES AND ADLS

## 2019-05-07 ASSESSMENT — PAIN DESCRIPTION - PROGRESSION
CLINICAL_PROGRESSION: GRADUALLY WORSENING

## 2019-05-07 ASSESSMENT — PAIN SCALES - GENERAL
PAINLEVEL_OUTOF10: 0
PAINLEVEL_OUTOF10: 6
PAINLEVEL_OUTOF10: 4
PAINLEVEL_OUTOF10: 6

## 2019-05-07 ASSESSMENT — PAIN DESCRIPTION - ORIENTATION: ORIENTATION: RIGHT

## 2019-05-07 ASSESSMENT — PAIN DESCRIPTION - DESCRIPTORS: DESCRIPTORS: ACHING

## 2019-05-07 ASSESSMENT — PAIN DESCRIPTION - ONSET: ONSET: ON-GOING

## 2019-05-07 ASSESSMENT — PAIN DESCRIPTION - PAIN TYPE: TYPE: ACUTE PAIN;CHRONIC PAIN

## 2019-05-07 ASSESSMENT — PAIN DESCRIPTION - FREQUENCY: FREQUENCY: INTERMITTENT

## 2019-05-07 NOTE — PLAN OF CARE
Problem: Falls - Risk of:  Goal: Will remain free from falls  Description  Will remain free from falls  5/7/2019 1126 by Valdo Crow RN  Outcome: Ongoing  5/7/2019 0254 by Amilcar Hyatt RN  Outcome: Ongoing  Goal: Absence of physical injury  Description  Absence of physical injury  5/7/2019 1126 by Valdo Crow RN  Outcome: Ongoing  5/7/2019 0254 by Amilcar Hyatt RN  Outcome: Ongoing     Problem: Infection:  Goal: Will remain free from infection  Description  Will remain free from infection  5/7/2019 1126 by Valdo Crow RN  Outcome: Ongoing  5/7/2019 0254 by Amilcar Hyatt RN  Outcome: Ongoing     Problem: Safety:  Goal: Free from accidental physical injury  Description  Free from accidental physical injury  5/7/2019 1126 by Valdo Crow RN  Outcome: Ongoing  5/7/2019 0254 by Amilcar Hyatt RN  Outcome: Ongoing  Goal: Free from intentional harm  Description  Free from intentional harm  5/7/2019 1126 by Valdo Crow RN  Outcome: Ongoing  5/7/2019 0254 by Amilcar Hyatt RN  Outcome: Ongoing     Problem: Daily Care:  Goal: Daily care needs are met  Description  Daily care needs are met  5/7/2019 1126 by Valdo Crow RN  Outcome: Ongoing  5/7/2019 0254 by Amilcar Hyatt RN  Outcome: Ongoing     Problem: Pain:  Goal: Patient's pain/discomfort is manageable  Description  Patient's pain/discomfort is manageable  5/7/2019 1126 by Valdo Crow RN  Outcome: Ongoing  5/7/2019 0254 by Amilcar Hyatt RN  Outcome: Ongoing  Goal: Pain level will decrease  Description  Pain level will decrease  5/7/2019 1126 by Valdo Crow RN  Outcome: Ongoing  5/7/2019 0254 by Amilcar Hyatt RN  Outcome: Ongoing  Goal: Control of acute pain  Description  Control of acute pain  5/7/2019 1126 by Valdo Crow RN  Outcome: Ongoing  5/7/2019 0254 by Amilcar Hyatt RN  Outcome: Ongoing  Goal: Control of chronic pain  Description  Control of chronic pain  5/7/2019 1126 by Valdo Crow RN  Outcome: Ongoing  5/7/2019 1915 by Manish Madera RN  Outcome: Ongoing     Problem: Skin Integrity:  Goal: Skin integrity will stabilize  Description  Skin integrity will stabilize  5/7/2019 1126 by Sumit Mcmullen RN  Outcome: Ongoing  5/7/2019 0254 by Manish Madera RN  Outcome: Ongoing     Problem: Discharge Planning:  Goal: Patients continuum of care needs are met  Description  Patients continuum of care needs are met  5/7/2019 1126 by Sumit Mcmullen RN  Outcome: Ongoing  5/7/2019 0254 by Manish Madera RN  Outcome: Ongoing

## 2019-05-07 NOTE — PROGRESS NOTES
Clinically stable. MRI of abdomen:  1. Moderate enlargement of the liver due to at least five complicated cystic   lesions, the largest located in the right hemiliver measuring up to 14.2 cm x   10.6 cm x 14.9 cm.  The lesions have thickened, diffusion restricting rims   and could represent necrotic metastases or abscesses.  Consider   ultrasound-guided core biopsy, potentially targeting the rim of the largest   lesion. 2. 4.4 cm x 2.9 cm x 3.8 cm centrally necrotic portohepatic lymph node with   features similar to the above liver lesions.  Considerations include   metastatic disease or lymphadenitis.  Of note, the finding abuts pancreatic   neck but appears separate from the pancreas.  This could potentially be   biopsied via endoscopy. 3. Borderline to mildly enlarged aortocaval and left para-aortic lymph nodes   with most minimal diffusion restriction are likely related to the above   processes. 4. Findings of volume overload include new small right and trace left pleural   effusions, increased trace ascites, and new minimal to mild anasarca. US of thyroid was unremarkable. Dr Jacquie Beasley has increased synthroid. We discussed about repeat biopsy of liver vs endoscopic biopsy of LN. He is agreeable to have repeat liver biopsy by IR. If biopsy is negative, I will consult GI to set up for endoscopic biopsy of LN.  98.7F 95 18 147/79  96%  AAOX3, no distress. Supple, R neck mass, stable. S1S2 no murmur. CTA b/l  Soft. BS present. Mild tenderness to RUQ. No pitting edema. A/P:  1. Liver masses. I schedule repeat liver biopsy. If negative, I will have endoscopic biopsy of LN. 2. H/N cancer. Will continue to monitor R neck mass. 3. Hypothyroidism. On synthroid. 4. Anemic w/u was reviewed. Will follow up. Thanks.

## 2019-05-07 NOTE — PROGRESS NOTES
Progress Note( Dr. Jazmin Foley)  5/7/2019  Subjective:   Admit Date: 5/4/2019  PCP: Heather Weeks MD    Admitted For :Nausea vomiting and abdominal pain    Consulted For: Better control of her hypothyroidism    Interval History: Continued abdominal pain going for liver biopsy second time    Denies any chest pains,   Denies SOB . Denies nausea or vomiting. No new bowel or bladder symptoms. Intake/Output Summary (Last 24 hours) at 5/7/2019 0726  Last data filed at 5/7/2019 0545  Gross per 24 hour   Intake 70 ml   Output --   Net 70 ml       DATA    CBC:   Recent Labs     05/05/19  0432   WBC 12.7*   HGB 9.2*       CMP:  Recent Labs     05/05/19  0432 05/06/19  0500    138   K 4.4 4.1    103   CO2 22 22   BUN 19 15   CREATININE 1.2 1.0   CALCIUM 8.8 9.0   PROT 6.1* 5.9*   LABALBU 3.2* 3.4   BILITOT 0.7 0.7   ALKPHOS 279* 279*   AST 41* 48*   ALT 20 20     Lipids: No results found for: CHOL, HDL, TRIG  Glucose:No results for input(s): POCGLU in the last 72 hours. [id number]:No results found for: LABA1C  High Sensitivity TSH:   Lab Results   Component Value Date    TSHHS 17.390 05/06/2019     Free T3: No results found for: FT3  Free T4:  Lab Results   Component Value Date    T4FREE 1.02 05/06/2019       Us Head Neck Soft Tissue Thyroid    Result Date: 5/7/2019  EXAMINATION: THYROID ULTRASOUND 5/7/2019 COMPARISON: None. HISTORY: ORDERING SYSTEM PROVIDED HISTORY: THYROID DISEASE TECHNOLOGIST PROVIDED HISTORY: Ordering Physician Provided Reason for Exam: Abnormal thyroid labs Acuity: Unknown Type of Exam: Initial FINDINGS: Right thyroid lobe:  4.0 x 0.9 x 1.3 cm Left thyroid lobe:  3.2 x 1.4 x 1.1 cm Isthmus:  4.9 mm Thyroid Gland:  Thyroid gland demonstrates normal echotexture and vascularity. Nodules: No thyroid nodules are present. Cervical lymphadenopathy: No abnormal lymph nodes in the imaged portions of the neck. Unremarkable thyroid ultrasound.      Xr Chest Portable    Result Date: 5/4/2019  EXAMINATION: SINGLE XRAY VIEW OF THE CHEST 5/4/2019 1:0  Low right lung volume with elevation of the right hemidiaphragm and right greater than left basilar atelectasis. No lobar consolidation. Mri Abdomen W Wo Contrast Mrcp    Result Date: 5/6/2019  EXAMINATION: MRI OF THE ABDOMEN WITH AND WITHOUT CONTRAST AND MRCP 5/6/2019 10:31 am   .     1. Moderate enlargement of the liver due to at least five complicated cystic lesions, the largest located in the right hemiliver measuring up to 14.2 cm x 10.6 cm x 14.9 cm. The lesions have thickened, diffusion restricting rims and could represent necrotic metastases or abscesses. Consider ultrasound-guided core biopsy, potentially targeting the rim of the largest lesion. 2. 4.4 cm x 2.9 cm x 3.8 cm centrally necrotic portohepatic lymph node with features similar to the above liver lesions. Considerations include metastatic disease or lymphadenitis. Of note, the finding abuts pancreatic neck but appears separate from the pancreas. This could potentially be biopsied via endoscopy. 3. Borderline to mildly enlarged aortocaval and left para-aortic lymph nodes with most minimal diffusion restriction are likely related to the above processes. 4. Findings of volume overload include new small right and trace left pleural effusions, increased trace ascites, and new minimal to mild anasarca. Cta Chest W Contrast    Result Date: 5/4/2019  EXAMINATION: CTA OF THE CHEST;     1. No acute vascular injury including dissection. 2. Hepatomegaly with interval appearance since 2017 of two dominant hypodense masses measuring up to 14 cm and 8 cm. Differential considerations include metastatic disease or abscess. 3. Cystic mass identified in the region of the pancreatic head measuring 2.6 cm. It is unclear if this lesion is pancreatic in origin or represents a partially necrotic lymph node. Evaluation is limited given the arterial phase of imaging.   Comparison to any recent CT scans would be helpful. Dedicated pancreatic CT protocol can also be considered for further evaluation. Of note, no associated pancreatic ductal dilatation or atrophy. 4. No acute intrathoracic abnormality. 5. Emphysema. Cta Abdomen Pelvis W Contrast    Result Date: 5/4/2019  EXAMINATION: CTA OF THE CHEST; CTA OF THE ABDOMEN AND PELVIS WITH CONTRAST, 5/4/2019 1:23 am; 5/4/2019 1:24 am     1. No acute vascular injury including dissection. 2. Hepatomegaly with interval appearance since 2017 of two dominant hypodense masses measuring up to 14 cm and 8 cm. Differential considerations include metastatic disease or abscess. 3. Cystic mass identified in the region of the pancreatic head measuring 2.6 cm. It is unclear if this lesion is pancreatic in origin or represents a partially necrotic lymph node. Evaluation is limited given the arterial phase of imaging. Comparison to any recent CT scans would be helpful. Dedicated pancreatic CT protocol can also be considered for further evaluation. Of note, no associated pancreatic ductal dilatation or atrophy. 4. No acute intrathoracic abnormality. 5. Emphysema.        Scheduled Medicines   Medications:    polyethylene glycol  17 g Oral Daily    levothyroxine  100 mcg Oral Daily    sodium chloride flush  10 mL Intravenous BID    sodium chloride flush  10 mL Intravenous 2 times per day    enoxaparin  40 mg Subcutaneous Daily      Infusions:    sodium chloride 75 mL/hr at 05/06/19 0745         Objective:   Vitals: BP (!) 147/79   Pulse 95   Temp 98.7 °F (37.1 °C) (Oral)   Resp 18   Ht 5' 5\" (1.651 m)   Wt 135 lb (61.2 kg)   SpO2 96%   BMI 22.47 kg/m²   General appearance: alert and cooperative with exam  Neck: no JVD or bruit  Thyroid : Normal lobes   Lungs: Has Vesicular Breath sounds   Heart:  regular rate and rhythm  Abdomen: soft, RUQ tender; bowel sounds normal; no masses,  no organomegaly  Musculoskeletal: Normal  Extremities: extremities

## 2019-05-07 NOTE — CONSULTS
Endocrinology   Consult Note  Dear Doctor Cathryn Pozo for the Consult     Pt. Was Admitted for : Nausea vomiting and abdominal pain    Reason for Consult:  Hypothyroidism    History Obtained From:  Patient/ EMR       HISTORY OF PRESENT ILLNESS:                The patient is a 63 y.o. male with significant past medical history of oropharyngeal cancer mild dysphagia but not able to eat noted to have liver masses has received chemotherapy and radiation therapy for oropharyngeal cancer comes in complaining of nausea vomiting and abdominal pain patient has has had biopsy of the liver masses . He was noted to be hypothyroid and started on levothyroxine. Still his T4 was borderline low and height high TSH appears like his dosing is not sufficient. I was  consulted for better control of hypothyroidism    ROS:   Pt's ROS done in detail. Abnormal ROS are noted in Medical and Surgical History Section below:      Other Medical History:        Diagnosis Date    Cancer [facility])     -(path report 5/2017- metastatic high grade squamous cell ca- from lymph node right neck mass and right tonsil)- doing chemo and radiation Dr Leland Garza and Dr Digna Lau done with chemo and radiation- per pt on 3/6/2018    Dysphagia     \"just started 8/7/2017- can not tolerate any solid foods- can drink water- protein drinks- baby food\" for peg tube insertion 8/16/2017( per pt on 3/6/2018 can eat pretty much anything now so taking the peg tube out)    Fear of needles     HX OTHER MEDICAL     \"passes out whenever they start an IV- takes Ativan and have him smell alcohol wipe- seems to help\"    Liver mass     \"found with my yearly check- for liver bx 1/17/2019    Prolonged emergence from general anesthesia     \"took awhile waking him up after last surgery\" per wife    Thyroid disease     \"just hypo thyroid\"     Surgical History:        Procedure Laterality Date    DENTAL SURGERY  1990's    \"wisdom teeth- put to sleep\"    GASTROSTOMY TUBE Hypothyroidism      Oropharyngeal cancer      Liver Cystic  masses      RECOMMENDATIONS:        1. Reviewed POC blood glucose . Labs and X ray results   2. Reviewed Home and Current Medicines   3. Will increase his Synthroid dose   4. Will do ultrasound of the thyroid gland and also antithyroid antibodies     Will follow with you  Again thank you for sharing pt's care with me.      Truly yours,       Alicia Holden MD

## 2019-05-07 NOTE — PROGRESS NOTES
wheezes, rales or rhonchi. Symmetric chest movement . CARDIO/VASC S1/S2 auscultated. Regular rate. GI Abdomen is soft , right sided tenderness with palp, Bowel sounds are normoactive. MSK No gross joint deformities. Spontaneous movement of all extremities  SKIN Normal coloration, warm, dry. NEURO normal speech, no lateralizing weakness. PSYCH Awake, alert, oriented x 4. Affect appropriate.     Medications:   Medications:    polyethylene glycol  17 g Oral Daily    levothyroxine  100 mcg Oral Daily    sodium chloride flush  10 mL Intravenous BID    sodium chloride flush  10 mL Intravenous 2 times per day    enoxaparin  40 mg Subcutaneous Daily      Infusions:    sodium chloride 75 mL/hr at 05/06/19 8079     PRN Meds:     iopamidol 80 mL ONCE PRN   sodium chloride flush 10 mL ONCE PRN   sodium chloride flush 10 mL PRN   magnesium hydroxide 30 mL Daily PRN   ondansetron 4 mg Q6H PRN   morphine 4 mg Q4H PRN   oxyCODONE 5 mg Q4H PRN         Electronically signed by Latesha Orozco MD on 5/7/2019 at 9:14 AM

## 2019-05-08 ENCOUNTER — APPOINTMENT (OUTPATIENT)
Dept: CT IMAGING | Age: 59
DRG: 442 | End: 2019-05-08
Payer: COMMERCIAL

## 2019-05-08 LAB
ANTITHYROGLOBULIN AB: <0.9 IU/ML (ref 0–4)
ANTITHYROGLOBULIN AB: NORMAL IU/ML (ref 0–4)
ANTITHYROID MICORSOMAL: <0.3 IU/ML (ref 0–9)
ANTITHYROID MICORSOMAL: NORMAL IU/ML (ref 0–9)

## 2019-05-08 PROCEDURE — 2580000003 HC RX 258: Performed by: HOSPITALIST

## 2019-05-08 PROCEDURE — 88305 TISSUE EXAM BY PATHOLOGIST: CPT

## 2019-05-08 PROCEDURE — 0FB13ZX EXCISION OF RIGHT LOBE LIVER, PERCUTANEOUS APPROACH, DIAGNOSTIC: ICD-10-PCS | Performed by: RADIOLOGY

## 2019-05-08 PROCEDURE — 87205 SMEAR GRAM STAIN: CPT

## 2019-05-08 PROCEDURE — 87073 CULTURE BACTERIA ANAEROBIC: CPT

## 2019-05-08 PROCEDURE — 1200000000 HC SEMI PRIVATE

## 2019-05-08 PROCEDURE — 6370000000 HC RX 637 (ALT 250 FOR IP): Performed by: HOSPITALIST

## 2019-05-08 PROCEDURE — 2709999900 HC NON-CHARGEABLE SUPPLY

## 2019-05-08 PROCEDURE — 87071 CULTURE AEROBIC QUANT OTHER: CPT

## 2019-05-08 PROCEDURE — 88108 CYTOPATH CONCENTRATE TECH: CPT

## 2019-05-08 PROCEDURE — 10140 I&D HMTMA SEROMA/FLUID COLLJ: CPT

## 2019-05-08 PROCEDURE — 94761 N-INVAS EAR/PLS OXIMETRY MLT: CPT

## 2019-05-08 RX ORDER — LEVOTHYROXINE SODIUM 0.1 MG/1
100 TABLET ORAL DAILY
Qty: 30 TABLET | Refills: 0 | Status: SHIPPED | OUTPATIENT
Start: 2019-05-09

## 2019-05-08 RX ORDER — OXYCODONE HYDROCHLORIDE 5 MG/1
5 TABLET ORAL EVERY 8 HOURS PRN
Qty: 10 TABLET | Refills: 0 | Status: SHIPPED | OUTPATIENT
Start: 2019-05-08 | End: 2019-05-11

## 2019-05-08 RX ADMIN — SODIUM CHLORIDE: 9 INJECTION, SOLUTION INTRAVENOUS at 16:33

## 2019-05-08 RX ADMIN — OXYCODONE HYDROCHLORIDE 5 MG: 5 TABLET ORAL at 19:41

## 2019-05-08 RX ADMIN — DOCUSATE SODIUM 100 MG: 100 CAPSULE, LIQUID FILLED ORAL at 20:37

## 2019-05-08 RX ADMIN — OXYCODONE HYDROCHLORIDE 5 MG: 5 TABLET ORAL at 14:38

## 2019-05-08 ASSESSMENT — PAIN DESCRIPTION - PROGRESSION
CLINICAL_PROGRESSION: GRADUALLY WORSENING

## 2019-05-08 ASSESSMENT — PAIN DESCRIPTION - FREQUENCY
FREQUENCY: INTERMITTENT
FREQUENCY: INTERMITTENT

## 2019-05-08 ASSESSMENT — PAIN DESCRIPTION - DESCRIPTORS
DESCRIPTORS: ACHING
DESCRIPTORS: ACHING

## 2019-05-08 ASSESSMENT — PAIN SCALES - GENERAL
PAINLEVEL_OUTOF10: 0
PAINLEVEL_OUTOF10: 7
PAINLEVEL_OUTOF10: 6
PAINLEVEL_OUTOF10: 6

## 2019-05-08 ASSESSMENT — PAIN DESCRIPTION - PAIN TYPE
TYPE: ACUTE PAIN
TYPE: ACUTE PAIN

## 2019-05-08 ASSESSMENT — PAIN DESCRIPTION - ONSET
ONSET: ON-GOING
ONSET: ON-GOING

## 2019-05-08 NOTE — DISCHARGE SUMMARY
Rosi Guardado MD, [address]   Internal Medicine Hospitalist  Discharge Summary    Nura Tamayo  :  1960  MRN:  [id number]    Admit date:  2019  Discharge date:   2019  Admitting Physician:  Zoie Kearney MD    Discharge Diagnoses:    Patient Active Problem List   Diagnosis    Abdominal pain       Admission Condition:  fair    Discharged Condition:  stable    Hospital Course:     (copied from admission history)  The patient is a 63 y.o. male with significant past medical history of esophageal cncer who has finished chemo aug 22 and radiation 2017 and known liver mass who underwent a biopsy 19 and did not show any tumors cells and no abscess. He presents as he has been sick all week with vomitng and had the \"stomach flu\". Yesterday evening while eating dinner he had a sharp pain right upper quadrant. Has had mild temperature 100.3 a few days ago. Cannot review other CT imaging to compare if masses are stable     2019- I saw him today and he is back from his liver biopsy, now has a drain in there as well. He wants to go home and I am checking with IR if ok to send home with drain and if so then what are the instructions for care. He will f/u with his oncologist.    Hospital Course:  (copied from last soap)    Abd pain with Hepatomegaly with hepatic and pancreatic mass with hx of esophageal cancer  -consult onc  -had recent liver biopsy and will need to discuss with onc whether another one will be beneficial and also cannot compare  previous CT imaging with prior  -had recent liver biopsy in January for liver mass and no tumor cells or abscess on cytology  -IVF, IV morphine  -received a dose of zoysn in ER and held further abx as had recent liver biopsy with no abscess  - MRI abd with contrast with multiple lesions possible necrotic metastatic vs abscesses.    - Biopsy planned      > h/o Hypothyroidism  > Elevated TSH  - on synthroid  - consult Endo  - dose increased     Acute kidney injury  -IVF, back to baseline.      Hypothyroid  -synthroid      Follow up appointment / plans: Needs to be seen within 7 days by his/her physician, patient to call for an appointment. On examination today  Heart is RRR, Lungs are CTA, abdomen is soft, CNS is grossly intact. Please see detailed consultation notes and radiology dictations as below. Vitals: Blood pressure 138/84, pulse 92, temperature 98.1 °F (36.7 °C), temperature source Oral, resp. rate 15, height 5' 5\" (1.651 m), weight 135 lb (61.2 kg), SpO2 96 %. Discharge Medications:        Medication List      START taking these medications    oxyCODONE 5 MG immediate release tablet  Commonly known as:  ROXICODONE  Take 1 tablet by mouth every 8 hours as needed for Pain for up to 3 days. CHANGE how you take these medications    levothyroxine 100 MCG tablet  Commonly known as:  SYNTHROID  Take 1 tablet by mouth Daily  Start taking on:  5/9/2019  What changed:    · medication strength  · how much to take  · Another medication with the same name was removed. Continue taking this medication, and follow the directions you see here. Where to Get Your Medications      You can get these medications from any pharmacy    Bring a paper prescription for each of these medications  · levothyroxine 100 MCG tablet  · oxyCODONE 5 MG immediate release tablet           Significant Diagnostic Studies:   Blood work reviewed.    CBC with Differential:    Lab Results   Component Value Date    WBC 12.7 05/05/2019    RBC 3.19 05/05/2019    RBC 4.14 08/22/2017    HGB 9.2 05/05/2019    HCT 31.3 05/05/2019     05/05/2019    MCV 98.1 05/05/2019    MCH 28.8 05/05/2019    MCHC 29.4 05/05/2019    RDW 13.5 05/05/2019    SEGSPCT 72.0 05/04/2019    LYMPHOPCT 14.2 05/04/2019    LYMPHOPCT 5.9 08/22/2017    MONOPCT 10.8 05/04/2019    BASOPCT 0.3 05/04/2019    MONOSABS 1.1 05/04/2019    LYMPHSABS 1.5 05/04/2019    EOSABS 0.2 05/04/2019    BASOSABS 0.0 05/04/2019 DIFFTYPE AUTOMATED DIFFERENTIAL 05/04/2019     CMP:    Lab Results   Component Value Date     05/06/2019    K 4.1 05/06/2019     05/06/2019    CO2 22 05/06/2019    BUN 15 05/06/2019    CREATININE 1.0 05/06/2019    GFRAA >60 05/06/2019    LABGLOM >60 05/06/2019    GLUCOSE 95 05/06/2019    PROT 5.9 05/06/2019    LABALBU 3.4 05/06/2019    CALCIUM 9.0 05/06/2019    BILITOT 0.7 05/06/2019    ALKPHOS 279 05/06/2019    AST 48 05/06/2019    ALT 20 05/06/2019        CT DRAINAGE HEMATOMA/SEROMA/FLUID COLLECTION [[id number]] Resulted: 05/08/19 1212      Order Status: No result Updated: 05/08/19 1213     CT NEEDLE BIOPSY LIVER PERCUTANEOUS [[id number]]      Order Status: Canceled      US HEAD NECK SOFT TISSUE THYROID [[id number]] Collected: 05/07/19 0715     Order Status: Completed Updated: 05/07/19 0717     Narrative:       EXAMINATION:  THYROID ULTRASOUND    5/7/2019    COMPARISON:  None. HISTORY:  ORDERING SYSTEM PROVIDED HISTORY: THYROID DISEASE  TECHNOLOGIST PROVIDED HISTORY:  Ordering Physician Provided Reason for Exam: Abnormal thyroid labs  Acuity: Unknown  Type of Exam: Initial    FINDINGS:  Right thyroid lobe:  4.0 x 0.9 x 1.3 cm    Left thyroid lobe:  3.2 x 1.4 x 1.1 cm    Isthmus:  4.9 mm    Thyroid Gland:  Thyroid gland demonstrates normal echotexture and vascularity. Nodules: No thyroid nodules are present. Cervical lymphadenopathy: No abnormal lymph nodes in the imaged portions of  the neck. Impression:       Unremarkable thyroid ultrasound.      MRI ABDOMEN W WO CONTRAST MRCP [[id number]] Collected: 05/06/19 1409     Order Status: Completed Updated: 05/06/19 1459     Narrative:       EXAMINATION:  MRI OF THE ABDOMEN WITH AND WITHOUT CONTRAST AND MRCP    5/6/2019 10:31 am    TECHNIQUE:  Multiplanar multisequence MRI of the abdomen was performed with and without  the administration of intravenous contrast.    COMPARISON:  Chest, abdomen, and pelvis CTA 05/04/2019; PET/CTs dated 11/16/2017 and  07/10/2017    HISTORY:  ORDERING SYSTEM PROVIDED HISTORY: liver and pancreatic mass. TECHNOLOGIST PROVIDED HISTORY:  Ordering Physician Provided Reason for Exam: liver lesions//pt became ill  while in scanner / not sure if images are enough to read/I called nurse and  told him to let us know when Mr. Rupesh Dexter feels better    Ongoing evaluation. FINDINGS:  LOWER CHEST:  New small right and trace left pleural effusions. Passive  atelectasis in the right lower lobe. Clear left lung base. Normal heart  size. No pericardial effusion. Elevation of the right hemidiaphragm. LIVER:  Moderately enlarged. No findings of steatosis or cirrhosis. A few  (at least five) complicated cystic lesions predominantly in the right  hemiliver with thick enhancing and diffusion restricting rims as well as  adjacent parenchymal edema, not present on 11/16/2017; for reference, the  largest measures approximately 14.2 cm x 10.6 cm x 14.9 cm in segments 5-8  (approximately 15.0 cm x 9.9 cm x 14.4 cm on 05/04/2019). GALLBLADDER/BILE DUCTS:  Normal gallbladder. No intrahepatic nor  extrahepatic biliary dilation. No obvious ductal filling defects nor  strictures. PANCREAS:  Typical duct configuration without dilation. No obvious ductal  filling defects nor strictures. Mildly atrophic parenchyma. SPLEEN:  Mildly atrophic. ADRENAL GLANDS:  Normal right adrenal gland. Unchanged 1.5 cm x 1.1 cm left  adrenal lesion likely representing a lipid-poor adenoma. KIDNEYS:  Simple bilateral cysts, measuring up to 5.7 cm (Bosniak category 1). GI/BOWEL:  Normal course and caliber of the stomach, small bowel, and colon  without obstruction. PELVIS:  Normal appearance of the partially included urinary bladder dome. PERITONEUM/RETROPERITONEUM:  4.4 cm x 2.9 cm x 3.8 cm enlarged, centrally  necrotic portohepatic lymph node with prominent diffusion restriction in the  peripheral solid component.   Borderline to mildly enlarged aortocaval and  left para-aortic lymph nodes with most minimal diffusion restriction; for  reference, 2.1 cm x 1.6 cm left para-aortic node. Trace free intraperitoneal  fluid. No obvious peritoneal nodularity nor enhancement. VESSELS:  Typical arterial anatomy. Minimal atherosclerosis. No definite  opacification of the right portal or right hepatic veins. Included veins  otherwise appear patent. SOFT TISSUES/BONES:  Minimal to mild subcutaneous edema. No suspicious  marrow lesions. Multilevel degenerative disc disease. Impression:       1. Moderate enlargement of the liver due to at least five complicated cystic  lesions, the largest located in the right hemiliver measuring up to 14.2 cm x  10.6 cm x 14.9 cm. The lesions have thickened, diffusion restricting rims  and could represent necrotic metastases or abscesses. Consider  ultrasound-guided core biopsy, potentially targeting the rim of the largest  lesion. 2. 4.4 cm x 2.9 cm x 3.8 cm centrally necrotic portohepatic lymph node with  features similar to the above liver lesions. Considerations include  metastatic disease or lymphadenitis. Of note, the finding abuts pancreatic  neck but appears separate from the pancreas. This could potentially be  biopsied via endoscopy. 3. Borderline to mildly enlarged aortocaval and left para-aortic lymph nodes  with most minimal diffusion restriction are likely related to the above  processes. 4. Findings of volume overload include new small right and trace left pleural  effusions, increased trace ascites, and new minimal to mild anasarca.      MRI ABDOMEN WO CONTRAST [[id number]] Updated: 05/06/19 1120     Order Status: Canceled      MRI ABDOMEN W WO CONTRAST MRCP [[id number]] Updated: 05/06/19 1031     Order Status: Canceled      MRI ABDOMEN W CONTRAST [[id number]]      Order Status: [id number] [facility] [[id number]] Collected: 05/04/19 0202     Order Status: Completed Updated: 05/04/19 2243     Narrative:       EXAMINATION:  CTA OF THE CHEST; CTA OF THE ABDOMEN AND PELVIS WITH CONTRAST, 5/4/2019 1:23  am; 5/4/2019 1:24 am    TECHNIQUE:  CTA of the chest was performed after the administration of intravenous  contrast.  Multiplanar reformatted images are provided for review. MIP  images are provided for review. Dose modulation, iterative reconstruction,  and/or weight based adjustment of the mA/kV was utilized to reduce the  radiation dose to as low as reasonably achievable.; CTA of the abdomen and  pelvis was performed with the administration of intravenous contrast.  Multiplanar reformatted images are provided for review. MIP images are  provided for review. Dose modulation, iterative reconstruction, and/or weight  based adjustment of the mA/kV was utilized to reduce the radiation dose to as  low as reasonably achievable. COMPARISON:  PET/CT dated November 16, 2017. HISTORY:  ORDERING SYSTEM PROVIDED HISTORY: Cancer, rib pain  TECHNOLOGIST PROVIDED HISTORY:  Ordering Physician Provided Reason for Exam: Rib pain cancer pt  Acuity: Chronic  Type of Exam: Ongoing  Additional signs and symptoms: Rib pain cancer pt  Relevant Medical/Surgical History: Isovue 370 80 mL ; ORDERING SYSTEM  PROVIDED HISTORY: Abd pain/cancer  TECHNOLOGIST PROVIDED HISTORY:  Ordering Physician Provided Reason for Exam: Abd pain  Acuity: Chronic  Type of Exam: Ongoing  Additional signs and symptoms: Rib pain cancer pt  Relevant Medical/Surgical History: Isovue 370 80 mL    FINDINGS:  Vasculature: The ascending aorta and descending thoracic aorta are normal in  course and caliber without evidence of acute injury including dissection. The arch branches are unremarkable. The abdominal aorta and its branches including the celiac trunk, SMA, renal  arteries, and RICH are patent and normal in course and caliber. Mild  atherosclerosis.     The bilateral common, external, and internal iliac arteries are patent with  mild atherosclerosis. Chest:    Mediastinum: The heart is normal in size without a pericardial effusion. The  main pulmonary artery is normal in caliber. No saddle embolus. No pathologically enlarged mediastinal or hilar nodes. Lungs/pleura: Moderate emphysema. No focal consolidations or pleural  effusions. Central airways are patent. Mild bronchial wall thickening. No  bronchiectasis. No suspicious pulmonary nodules. Soft Tissues/Bones: No acute or aggressive osseous lesion. Degenerative  changes of the spine are seen. Abdomen/Pelvis:    Organs: The liver is significantly enlarged with a dominant hypodense mass in  the right hepatic lobe measuring 14 x 12 x 10 cm. Another smaller hypodense  mass is seen more inferiorly measuring 8 x 7 x 6 cm. Other smaller lesions  are not well seen due to the phase of imaging. Gallbladder is not identified and may be surgically absent. The spleen,  adrenal glands, and kidneys demonstrate no acute abnormality. Bilateral  renal cysts are seen, the largest of which measures 4.8 x 3.6 cm. Cystic mass is identified in the region of the pancreatic head measuring 2.6  x 2.6 cm. No distal pancreatic atrophy or ductal dilatation. GI/bowel: The stomach, small bowel, and colon are normal in course and  caliber without evidence of wall thickening or obstruction. Normal appendix. Pelvis: Normal bladder. Prostate and seminal vesicles are grossly  unremarkable. Peritoneum/Retroperitoneum: Small amount of free fluid is seen in the pelvis. No free air. Evaluation for adenopathy is limited given the phase of  imaging. Prominent left para-aortic lymph nodes measure up to 10 mm in short  axis. Bones/Soft Tissues: No acute or aggressive osseous lesion. Degenerative  changes of the spine are seen. Impression:       1. No acute vascular injury including dissection.   2. Hepatomegaly with interval appearance since 2017 of two dominant hypodense  masses measuring up to 14 cm and 8 cm. Differential considerations include  metastatic disease or abscess. 3. Cystic mass identified in the region of the pancreatic head measuring 2.6  cm. It is unclear if this lesion is pancreatic in origin or represents a  partially necrotic lymph node. Evaluation is limited given the arterial  phase of imaging. Comparison to any recent CT scans would be helpful. Dedicated pancreatic CT protocol can also be considered for further  evaluation. Of note, no associated pancreatic ductal dilatation or atrophy. 4. No acute intrathoracic abnormality. 5. Emphysema. CTA CHEST W CONTRAST [[id number]] Collected: 05/04/19 0202     Order Status: Completed Updated: 05/04/19 2243     Narrative:       EXAMINATION:  CTA OF THE CHEST; CTA OF THE ABDOMEN AND PELVIS WITH CONTRAST, 5/4/2019 1:23  am; 5/4/2019 1:24 am    TECHNIQUE:  CTA of the chest was performed after the administration of intravenous  contrast.  Multiplanar reformatted images are provided for review. MIP  images are provided for review. Dose modulation, iterative reconstruction,  and/or weight based adjustment of the mA/kV was utilized to reduce the  radiation dose to as low as reasonably achievable.; CTA of the abdomen and  pelvis was performed with the administration of intravenous contrast.  Multiplanar reformatted images are provided for review. MIP images are  provided for review. Dose modulation, iterative reconstruction, and/or weight  based adjustment of the mA/kV was utilized to reduce the radiation dose to as  low as reasonably achievable. COMPARISON:  PET/CT dated November 16, 2017.     HISTORY:  ORDERING SYSTEM PROVIDED HISTORY: Cancer, rib pain  TECHNOLOGIST PROVIDED HISTORY:  Ordering Physician Provided Reason for Exam: Rib pain cancer pt  Acuity: Chronic  Type of Exam: Ongoing  Additional signs and symptoms: Rib pain cancer pt  Relevant Medical/Surgical History: Isovue 370 80 mL ; ORDERING SYSTEM  PROVIDED HISTORY: Abd pain/cancer  TECHNOLOGIST PROVIDED HISTORY:  Ordering Physician Provided Reason for Exam: Abd pain  Acuity: Chronic  Type of Exam: Ongoing  Additional signs and symptoms: Rib pain cancer pt  Relevant Medical/Surgical History: Isovue 370 80 mL    FINDINGS:  Vasculature: The ascending aorta and descending thoracic aorta are normal in  course and caliber without evidence of acute injury including dissection. The arch branches are unremarkable. The abdominal aorta and its branches including the celiac trunk, SMA, renal  arteries, and RICH are patent and normal in course and caliber. Mild  atherosclerosis. The bilateral common, external, and internal iliac arteries are patent with  mild atherosclerosis. Chest:    Mediastinum: The heart is normal in size without a pericardial effusion. The  main pulmonary artery is normal in caliber. No saddle embolus. No pathologically enlarged mediastinal or hilar nodes. Lungs/pleura: Moderate emphysema. No focal consolidations or pleural  effusions. Central airways are patent. Mild bronchial wall thickening. No  bronchiectasis. No suspicious pulmonary nodules. Soft Tissues/Bones: No acute or aggressive osseous lesion. Degenerative  changes of the spine are seen. Abdomen/Pelvis:    Organs: The liver is significantly enlarged with a dominant hypodense mass in  the right hepatic lobe measuring 14 x 12 x 10 cm. Another smaller hypodense  mass is seen more inferiorly measuring 8 x 7 x 6 cm. Other smaller lesions  are not well seen due to the phase of imaging. Gallbladder is not identified and may be surgically absent. The spleen,  adrenal glands, and kidneys demonstrate no acute abnormality. Bilateral  renal cysts are seen, the largest of which measures 4.8 x 3.6 cm. Cystic mass is identified in the region of the pancreatic head measuring 2.6  x 2.6 cm.   No distal pancreatic atrophy or ductal consolidation. No pleural  effusion or pneumothorax. Borderline cardiomegaly. Atherosclerotic thoracic  aorta. Multilevel degenerative disc disease. Impression:       Low right lung volume with elevation of the right hemidiaphragm and right  greater than left basilar atelectasis. No lobar consolidation. Rimma Lackey MD   Physician   Hematology Oncology   Consults   Signed   Date of Service:  2019 12:58 PM            Consult Orders   Inpatient consult to Oncology [[id number]] ordered by Mac Phan MD at 19 [id number]          Signed        Expand All Collapse All           Show:Clear all  [x]Manual[x]Template[x]Copied    Added by:  [x]Jyothi Madelon Spurling, MD      []Mansi for details      [facility]  Consult Note     2019  7:08 PM     Patient:    Jordan Solorio                : 1960   63 y.o. MRN: [id number]  Admitted: 2019 12:07 AM ATT: Oksana Yoon MD                             [id number]  AdmitDx: Abdominal pain [R10.9]  Liver mass [R16.0]  Pancreatic mass [K86.9]  Flank pain [R10.9]  History of cancer [Z85.9]                    PCP: No primary care provider on file. 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 He completed Erbitux on 2017.  He completed radiation therapy on September 1, 2017. US guided liver cyst mass aspiration on January 17, 2019 was done. Path report was negative for malignancy. CT soft tissue neck and chest in December 2018 showed:  1. Irregular shape low-attenuation mass within the right side of the neck which may be related to necrotic node 2.5 x 2.8 x 1.8 cm.   2. Enlargement of the uvula and epiglottis which could be related to edema from posttreatment changes. 3. No intrathoracic disease. 4. Large new mass within the liver suspicious for metastatic disease. CT abdomen with contrast was recommended. Clinically his right neck lump has remained stable. I offer him PET scan versus CT abdomen. He opted to CT abdomen with contrast. He wants to defer biopsy of the right neck lump which as per patient has remained stable since completion of chemo and radiation. On January 3, 2019 he came in for follow up visit. CT of abdomen on 12/27/2018: As noted on the recent chest CT, an 11.5 x 10.1 cm complex predominantly cystic mass has developed within the anterior segment of the right hepatic lobe, and demonstrates heterogeneous soft tissue density at the periphery of this mass, indeterminate. Given the patient's history of tonsillar malignancy, a cystic metastasis certainly needs to be considered. An hepatic abscess could also be considered, as clinically indicated. Percutaneous sampling of the lesion, especially the peripheral complex component may be required. An additional 2 cm cystic lesion is also developed more inferiorly within the right hepatic lobe, indeterminate. 5/4/19: Ct  1. No acute vascular injury including dissection. 2. Hepatomegaly with interval appearance since 2017 of two dominant hypodense   masses measuring up to 14 cm and 8 cm. Differential considerations include   metastatic disease or abscess. 3. Cystic mass identified in the region of the pancreatic head measuring 2.6   cm.   It is unclear if this lesion is pancreatic in origin or represents a   partially necrotic lymph node. Evaluation is limited given the arterial   phase of imaging. Comparison to any recent CT scans would be helpful. Dedicated pancreatic CT protocol can also be considered for further   evaluation. Of note, no associated pancreatic ductal dilatation or atrophy. 4. No acute intrathoracic abnormality. 5. Emphysema. Reported worsening abdominal pain, nausea, emesis and loose stools  And though that he had stomach flu. But worsening right sided abdominal pain, no radiation. No overt bleeding. Denied any chest pain. Denied any cough. Reported fever.       Past Medical History:    Past Medical History             Diagnosis Date    Cancer [facility])       -(path report 5/2017- metastatic high grade squamous cell ca- from lymph node right neck mass and right tonsil)- doing chemo and radiation Dr Victoriano Nye and Dr Ursula Lu done with chemo and radiation- per pt on 3/6/2018    Dysphagia       \"just started 8/7/2017- can not tolerate any solid foods- can drink water- protein drinks- baby food\" for peg tube insertion 8/16/2017( per pt on 3/6/2018 can eat pretty much anything now so taking the peg tube out)    Fear of needles      HX OTHER MEDICAL       \"passes out whenever they start an IV- takes Ativan and have him smell alcohol wipe- seems to help\"    Liver mass       \"found with my yearly check- for liver bx 1/17/2019    Prolonged emergence from general anesthesia       \"took awhile waking him up after last surgery\" per wife    Thyroid disease       \"just hypo thyroid\"            Past Surgical History:    Past Surgical History             Procedure Laterality Date    DENTAL SURGERY   1990's     \"wisdom teeth- put to sleep\"    GASTROSTOMY TUBE PLACEMENT   08/2017     removed 11/2017   [place]     yumiko ing hernia repair    LARYNGOSCOPY   05/25/2017     direct laryngoscopy with bx of right tonsil and exc of right cervical mass with Dr Gita Serrano               Current Medications:    Medications    Scheduled Medications:   Scheduled Medications    sodium chloride flush  10 mL Intravenous BID    levothyroxine  50 mcg Oral Daily    sodium chloride flush  10 mL Intravenous 2 times per day    enoxaparin  40 mg Subcutaneous Daily         PRN Medications:   PRN Medications   iopamidol, sodium chloride flush, sodium chloride flush, magnesium hydroxide, ondansetron, morphine, oxyCODONE           Allergies:  Patient has no known allergies. Social History:   TOBACCO:   reports that he has been smoking cigarettes. He has a 5.50 pack-year smoking history. He has never used smokeless tobacco.  ETOH:   reports that he does not drink alcohol. Family History:   Family History             Problem Relation Age of Onset   [employer] Cancer Mother           brain tumor    Cancer Father           throat cancer            REVIEW OF SYSTEMS:    Per HPI  PHYSICAL EXAM:       Vitals:    /67   Pulse 91   Temp 98.6 °F (37 °C) (Oral)   Resp 20   Ht 5' 5\" (1.651 m)   Wt 135 lb (61.2 kg)   SpO2 96%   BMI 22.47 kg/m²   aao x 3  No JVD  MM dry  ctab  s1s2 rrr no murmurs  Soft ttp ruq bs pos, voluntary guarding. Mild LE edema yumiko  GI  DATA:    ABGs: No results found for: PHART, PO2ART, APP8OQG  CBC:        Recent Labs     05/04/19  0020 05/05/19  0432   WBC 10.6* 12.7*   HGB 10.2* 9.2*    294      BMP:         Recent Labs     05/04/19  0020 05/05/19  0432    138   K 4.4 4.4   CL 96* 103   CO2 26 22   BUN 29* 19   CREATININE 1.3 1.2   GLUCOSE 111* 89      Magnesium:         Lab Results   Component Value Date     MG 1.8 08/22/2017      Hepatic:        Recent Labs     05/04/19  0020 05/05/19  0432   AST 51* 41*   ALT 28 20   BILITOT 0.5 0.7   ALKPHOS 349* 279*          Recent Labs     05/04/19  0020   LIPASE 43          Recent Labs     05/04/19  0025   PROTIME 11.9   INR 1.04      No results for input(s): PTT in the last 72 hours.   Lipids: No results for input(s): CHOL, HDL in the last 72 hours. Invalid input(s): LDLCALCU  INR:       Recent Labs     05/04/19  0025   INR 1.04      TSH: No results found for: TSH     Intake/Output Summary (Last 24 hours) at 5/5/2019 1908  Last data filed at 5/5/2019 1900      Gross per 24 hour   Intake 2145 ml   Output 1225 ml   Net 920 ml      Infusions Meds    sodium chloride 75 mL/hr at 05/05/19 1545      Tonsillar cancer: S/P CXRT, is being followed by Dr Corie Bassett and ENT     Liver lesions and cystic mass: Recommend further evaluation with MRI abdomen for further plan. May need repeat biopsy/MRCP. Elevated liver enzymes: sec to ?mets     Leukocytosis Most probably reactive, monitor for any infection. NC anemia. CKD     Continue other medical care and will follow along     Thank you for letting us be part of the care and will follow along     Discussed the above findings and plan with Mr Le Marcial and he verbalized understanding     Juan Alberto Garcia MD  5/5/2019  7:08 Morenita Avalos MD   Physician   Endocrinology   Consults   Signed   Date of Service:  5/6/2019 12:30 PM               Signed        Expand All Collapse All           Show:Clear all  [x]Manual[x]Template[]Copied    Added by:  [x]M Nancy Bautista MD      []Mansi for details      Endocrinology   Consult Note  Dear Doctor Huyen Marques        Thank You for the Consult      Pt. Was Admitted for : Nausea vomiting and abdominal pain     Reason for Consult:  Hypothyroidism     History Obtained From:  Patient/ EMR         HISTORY OF PRESENT ILLNESS:                 The patient is a 63 y.o. male with significant past medical history of oropharyngeal cancer mild dysphagia but not able to eat noted to have liver masses has received chemotherapy and radiation therapy for oropharyngeal cancer comes in complaining of nausea vomiting and abdominal pain patient has has had biopsy of the liver masses .   He was noted to be hypothyroid and started on levothyroxine. Still his T4 was borderline low and height high TSH appears like his dosing is not sufficient. I was  consulted for better control of hypothyroidism     ROS:   Pt's ROS done in detail. 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)- doing chemo and radiation Dr Paul Hernandez and Dr Holly Fernandez done with chemo and radiation- per pt on 3/6/2018    Dysphagia       \"just started 8/7/2017- can not tolerate any solid foods- can drink water- protein drinks- baby food\" for peg tube insertion 8/16/2017( per pt on 3/6/2018 can eat pretty much anything now so taking the peg tube out)    Fear of needles      HX OTHER MEDICAL       \"passes out whenever they start an IV- takes Ativan and have him smell alcohol wipe- seems to help\"    Liver mass       \"found with my yearly check- for liver bx 1/17/2019    Prolonged emergence from general anesthesia       \"took awhile waking him up after last surgery\" per wife    Thyroid disease       \"just hypo thyroid\"         Surgical History:    Past Surgical History             Procedure Laterality Date    DENTAL SURGERY   1990's     \"wisdom teeth- put to sleep\"    GASTROSTOMY TUBE PLACEMENT   08/2017     removed 11/2017   [place]     yumiko ing hernia repair    LARYNGOSCOPY   05/25/2017     direct laryngoscopy with bx of right tonsil and exc of right cervical mass with Dr Naty Oconnor            Allergies:  Patient has no known allergies.      Family History:   Family History             Problem Relation Age of Onset    Cancer Mother           brain tumor    Cancer Father           throat cancer         REVIEW OF SYSTEMS:  Review of System Done as noted above      PHYSICAL EXAM:       Vitals:    /72   Pulse 95 Temp 98.2 °F (36.8 °C) (Oral)   Resp 18   Ht 5' 5\" (1.651 m)   Wt 135 lb (61.2 kg)   SpO2 96%   BMI 22.47 kg/m²      CONSTITUTIONAL:  awake, alert, cooperative, appears stated age   EYES:  vision intact Fundoscopic Exam not performed   ENT:Normal  NECK:  Supple, No JVD. Thyroid Exam:Normal possibility atrophy due to radiation treatment  LUNGS:  Has Vesicular Breath Sounds,   CARDIOVASCULAR:  Normal apical impulse, regular rate and rhythm, normal S1 and S2, no S3 or S4, and has no  murmur   ABDOMEN:  No scars, normal bowel sounds, soft, non-distended, tender, no masses palpated, no hepatolienomegaly  Musculoskeletal: Normal  Extremities: Normal, peripheral pulses normal, , has no edema   NEUROLOGIC:  Awake, alert, oriented to name, place and time. Cranial nerves II-XII are grossly intact. Motor is  intact. Sensory is intact. ,  and gait is normal.     DATA:     CBC:        Recent Labs     05/04/19  0020 05/05/19  0432   WBC 10.6* 12.7*   HGB 10.2* 9.2*    294    CMP:        Recent Labs     05/04/19  0020 05/05/19  0432 05/06/19  0500    138 138   K 4.4 4.4 4.1   CL 96* 103 103   CO2 26 22 22   BUN 29* 19 15   CREATININE 1.3 1.2 1.0   CALCIUM 9.6 8.8 9.0   PROT 7.5 6.1* 5.9*   LABALBU 3.9 3.2* 3.4   BILITOT 0.5 0.7 0.7   ALKPHOS 349* 279* 279*   AST 51* 41* 48*   ALT 28 20 20      Lipids: No results found for: CHOL, HDL, TRIG  Glucose: No results for input(s): POCGLU in the last 72 hours. Hemoglobin A1C: No results found for: LABA1C  Free T4:         Lab Results   Component Value Date     T4FREE 1.02 05/06/2019      Free T3: No results found for: FT3  TSH High Sensitivity:         Lab Results   Component Value Date     TSHHS 17.390 05/06/2019         Xr Chest Portable     Result Date: 5/4/2019  EXAMINATION: SINGLE XRAY VIEW OF THE CHEST 5/4/2019 1:05 am COMPARISON:      Low right lung volume with elevation of the right hemidiaphragm and right greater than left basilar atelectasis.   No lobar consolidation. Mri Abdomen W Wo Contrast Mrcp     Result Date: 5/6/2019  EXAMINATION: MRI OF THE ABDOMEN WITH AND WITHOUT CONTRAST AND MRCP 5/6/2019 10:31 am      1. Moderate enlargement of the liver due to at least five complicated cystic lesions, the largest located in the right hemiliver measuring up to 14.2 cm x 10.6 cm x 14.9 cm. The lesions have thickened, diffusion restricting rims and could represent necrotic metastases or abscesses. Consider ultrasound-guided core biopsy, potentially targeting the rim of the largest lesion. 2. 4.4 cm x 2.9 cm x 3.8 cm centrally necrotic portohepatic lymph node with features similar to the above liver lesions. Considerations include metastatic disease or lymphadenitis. Of note, the finding abuts pancreatic neck but appears separate from the pancreas. This could potentially be biopsied via endoscopy. 3. Borderline to mildly enlarged aortocaval and left para-aortic lymph nodes with most minimal diffusion restriction are likely related to the above processes. 4. Findings of volume overload include new small right and trace left pleural effusions, increased trace ascites, and new minimal to mild anasarca. Cta Chest W Contrast     Result Date: 5/4/2019  EXAMINATION: CTA OF THE CHEST; CTA OF THE ABDOMEN AND PELVIS WITH CONTRAST, 5/4/2019 1:23 am; 5/4/2019 1:24 am      1. No acute vascular injury including dissection. 2. Hepatomegaly with interval appearance since 2017 of two dominant hypodense masses measuring up to 14 cm and 8 cm. Differential considerations include metastatic disease or abscess. 3. Cystic mass identified in the region of the pancreatic head measuring 2.6 cm. It is unclear if this lesion is pancreatic in origin or represents a partially necrotic lymph node. Evaluation is limited given the arterial phase of imaging. Comparison to any recent CT scans would be helpful. Dedicated pancreatic CT protocol can also be considered for further evaluation. Of note, no associated pancreatic ductal dilatation or atrophy. 4. No acute intrathoracic abnormality. 5. Emphysema. Cta Abdomen Pelvis W Contrast     Result Date: 5/4/2019  EXAMINATION: CTA OF THE CHEST; CTA OF THE ABDOMEN AND PELVIS WITH CONTRAST, 5/4/2019 1:23 am; 5/4/2019 1:24 am      1. No acute vascular injury including dissection. 2. Hepatomegaly with interval appearance since 2017 of two dominant hypodense masses measuring up to 14 cm and 8 cm. Differential considerations include metastatic disease or abscess. 3. Cystic mass identified in the region of the pancreatic head measuring 2.6 cm. It is unclear if this lesion is pancreatic in origin or represents a partially necrotic lymph node. Evaluation is limited given the arterial phase of imaging. Comparison to any recent CT scans would be helpful. Dedicated pancreatic CT protocol can also be considered for further evaluation. Of note, no associated pancreatic ductal dilatation or atrophy. 4. No acute intrathoracic abnormality. 5. Emphysema. Scheduled Medicines   Medications:   Scheduled Medications    [START ON 5/7/2019] polyethylene glycol  17 g Oral Daily    sodium chloride flush  10 mL Intravenous BID    levothyroxine  50 mcg Oral Daily    sodium chloride flush  10 mL Intravenous 2 times per day    enoxaparin  40 mg Subcutaneous Daily         Infusions:   Infusions Meds    sodium chloride 75 mL/hr at 05/06/19 6498               IMPRESSION         Patient Active Problem List   Diagnosis    Abdominal pain       Hypothyroidism      Oropharyngeal cancer      Liver Cystic  masses        RECOMMENDATIONS:          1. Reviewed POC blood glucose . Labs and X ray results   2. Reviewed Home and Current Medicines   3. Will increase his Synthroid dose   4. Will do ultrasound of the thyroid gland and also antithyroid antibodies      Will follow with you  Again thank you for sharing pt's care with me.       Truly yours,         Celena Gomez MD                    Disposition:   Home, RN to check about drain. All the above has been explained to patient and or family. Patient would need F/U with his/her PCP in 7 days. Explained that it is the patients responsibility to have blood work or radiology testing done as an out patient. He/She has to take the discharge papers from the hospital for out patient follow up visit with the PCP/Physician. Time spent  >30 minutes.   Signed:  Lis Centeno MD, [address]  5/8/2019, 3:43 PM

## 2019-05-08 NOTE — PROGRESS NOTES
He is scheduled for liver biopsy today and will be discharged home. No acute complaints today. I advise to follow up at cancer ctr within 1 - 2 weeks to discuss result. Thanks.

## 2019-05-08 NOTE — PROGRESS NOTES
Progress Note( Dr. Vicky Sánchez)  5/8/2019  Subjective:   Admit Date: 5/4/2019  PCP: Grupo Quevedo MD    Admitted For :Nausea vomiting and abdominal pain    Consulted For: Better control of her hypothyroidism    Interval History: Continued abdominal pain going for liver biopsy second time    Denies any chest pains,   Denies SOB . Denies nausea or vomiting. No new bowel or bladder symptoms. Intake/Output Summary (Last 24 hours) at 5/8/2019 0804  Last data filed at 5/8/2019 0631  Gross per 24 hour   Intake 775 ml   Output --   Net 775 ml       DATA    CBC:   No results for input(s): WBC, HGB, PLT in the last 72 hours. CMP:  Recent Labs     05/06/19  0500      K 4.1      CO2 22   BUN 15   CREATININE 1.0   CALCIUM 9.0   PROT 5.9*   LABALBU 3.4   BILITOT 0.7   ALKPHOS 279*   AST 48*   ALT 20     Lipids: No results found for: CHOL, HDL, TRIG  Glucose:No results for input(s): POCGLU in the last 72 hours. [id number]:No results found for: LABA1C  High Sensitivity TSH:   Lab Results   Component Value Date    TSHHS 17.390 05/06/2019     Free T3: No results found for: FT3  Free T4:  Lab Results   Component Value Date    T4FREE 1.14 05/07/2019       Us Head Neck Soft Tissue Thyroid    Result Date: 5/7/2019  EXAMINATION: THYROID ULTRASOUND 5/7/2019 COMPARISON: None. HISTORY: ORDERING SYSTEM PROVIDED HISTORY: THYROID DISEASE TECHNOLOGIST PROVIDED HISTORY: Ordering Physician Provided Reason for Exam: Abnormal thyroid labs Acuity: Unknown Type of Exam: Initial FINDINGS: Right thyroid lobe:  4.0 x 0.9 x 1.3 cm Left thyroid lobe:  3.2 x 1.4 x 1.1 cm Isthmus:  4.9 mm Thyroid Gland:  Thyroid gland demonstrates normal echotexture and vascularity. Nodules: No thyroid nodules are present. Cervical lymphadenopathy: No abnormal lymph nodes in the imaged portions of the neck. Unremarkable thyroid ultrasound.      Xr Chest Portable    Result Date: 5/4/2019  EXAMINATION: SINGLE XRAY VIEW OF THE CHEST 5/4/2019 1:0  Low right lung volume with elevation of the right hemidiaphragm and right greater than left basilar atelectasis. No lobar consolidation. Mri Abdomen W Wo Contrast Mrcp    Result Date: 5/6/2019  EXAMINATION: MRI OF THE ABDOMEN WITH AND WITHOUT CONTRAST AND MRCP 5/6/2019 10:31 am   .     1. Moderate enlargement of the liver due to at least five complicated cystic lesions, the largest located in the right hemiliver measuring up to 14.2 cm x 10.6 cm x 14.9 cm. The lesions have thickened, diffusion restricting rims and could represent necrotic metastases or abscesses. Consider ultrasound-guided core biopsy, potentially targeting the rim of the largest lesion. 2. 4.4 cm x 2.9 cm x 3.8 cm centrally necrotic portohepatic lymph node with features similar to the above liver lesions. Considerations include metastatic disease or lymphadenitis. Of note, the finding abuts pancreatic neck but appears separate from the pancreas. This could potentially be biopsied via endoscopy. 3. Borderline to mildly enlarged aortocaval and left para-aortic lymph nodes with most minimal diffusion restriction are likely related to the above processes. 4. Findings of volume overload include new small right and trace left pleural effusions, increased trace ascites, and new minimal to mild anasarca. Cta Chest W Contrast    Result Date: 5/4/2019  EXAMINATION: CTA OF THE CHEST;     1. No acute vascular injury including dissection. 2. Hepatomegaly with interval appearance since 2017 of two dominant hypodense masses measuring up to 14 cm and 8 cm. Differential considerations include metastatic disease or abscess. 3. Cystic mass identified in the region of the pancreatic head measuring 2.6 cm. It is unclear if this lesion is pancreatic in origin or represents a partially necrotic lymph node. Evaluation is limited given the arterial phase of imaging. Comparison to any recent CT scans would be helpful.  Dedicated pancreatic CT protocol can also

## 2019-05-09 PROBLEM — R79.89 ABNORMAL LFTS: Status: ACTIVE | Noted: 2019-05-09

## 2019-05-09 PROBLEM — R16.0 LIVER MASS: Status: ACTIVE | Noted: 2019-05-09

## 2019-05-09 LAB
ALBUMIN SERPL-MCNC: 3.2 GM/DL (ref 3.4–5)
ALP BLD-CCNC: 273 IU/L (ref 40–128)
ALT SERPL-CCNC: 20 U/L (ref 10–40)
ANION GAP SERPL CALCULATED.3IONS-SCNC: 12 MMOL/L (ref 4–16)
AST SERPL-CCNC: 34 IU/L (ref 15–37)
BILIRUB SERPL-MCNC: 0.5 MG/DL (ref 0–1)
BUN BLDV-MCNC: 21 MG/DL (ref 6–23)
CALCIUM SERPL-MCNC: 8.6 MG/DL (ref 8.3–10.6)
CHLORIDE BLD-SCNC: 100 MMOL/L (ref 99–110)
CO2: 23 MMOL/L (ref 21–32)
CREAT SERPL-MCNC: 0.8 MG/DL (ref 0.9–1.3)
CULTURE: NORMAL
CULTURE: NORMAL
GFR AFRICAN AMERICAN: >60 ML/MIN/1.73M2
GFR NON-AFRICAN AMERICAN: >60 ML/MIN/1.73M2
GLUCOSE BLD-MCNC: 152 MG/DL (ref 70–99)
HCT VFR BLD CALC: 33.8 % (ref 42–52)
HCT VFR BLD CALC: 35.3 % (ref 42–52)
HEMOGLOBIN: 10.6 GM/DL (ref 13.5–18)
HEMOGLOBIN: 11.1 GM/DL (ref 13.5–18)
Lab: NORMAL
Lab: NORMAL
MCH RBC QN AUTO: 29.4 PG (ref 27–31)
MCHC RBC AUTO-ENTMCNC: 31.4 % (ref 32–36)
MCV RBC AUTO: 93.6 FL (ref 78–100)
PDW BLD-RTO: 13.9 % (ref 11.7–14.9)
PHOSPHORUS: 3.2 MG/DL (ref 2.5–4.9)
PLATELET # BLD: 350 K/CU MM (ref 140–440)
PMV BLD AUTO: 9.2 FL (ref 7.5–11.1)
POTASSIUM SERPL-SCNC: 4.2 MMOL/L (ref 3.5–5.1)
RBC # BLD: 3.61 M/CU MM (ref 4.6–6.2)
SODIUM BLD-SCNC: 135 MMOL/L (ref 135–145)
SPECIMEN: NORMAL
SPECIMEN: NORMAL
TOTAL PROTEIN: 5.7 GM/DL (ref 6.4–8.2)
WBC # BLD: 11.6 K/CU MM (ref 4–10.5)

## 2019-05-09 PROCEDURE — 85014 HEMATOCRIT: CPT

## 2019-05-09 PROCEDURE — 99254 IP/OBS CNSLTJ NEW/EST MOD 60: CPT | Performed by: INTERNAL MEDICINE

## 2019-05-09 PROCEDURE — 6370000000 HC RX 637 (ALT 250 FOR IP): Performed by: HOSPITALIST

## 2019-05-09 PROCEDURE — 94761 N-INVAS EAR/PLS OXIMETRY MLT: CPT

## 2019-05-09 PROCEDURE — 80053 COMPREHEN METABOLIC PANEL: CPT

## 2019-05-09 PROCEDURE — 85027 COMPLETE CBC AUTOMATED: CPT

## 2019-05-09 PROCEDURE — 2580000003 HC RX 258: Performed by: HOSPITALIST

## 2019-05-09 PROCEDURE — 36415 COLL VENOUS BLD VENIPUNCTURE: CPT

## 2019-05-09 PROCEDURE — 1200000000 HC SEMI PRIVATE

## 2019-05-09 PROCEDURE — 2580000003 HC RX 258: Performed by: INTERNAL MEDICINE

## 2019-05-09 PROCEDURE — 84100 ASSAY OF PHOSPHORUS: CPT

## 2019-05-09 PROCEDURE — 6370000000 HC RX 637 (ALT 250 FOR IP): Performed by: INTERNAL MEDICINE

## 2019-05-09 PROCEDURE — 6360000002 HC RX W HCPCS: Performed by: INTERNAL MEDICINE

## 2019-05-09 PROCEDURE — 85018 HEMOGLOBIN: CPT

## 2019-05-09 PROCEDURE — 2500000003 HC RX 250 WO HCPCS: Performed by: INTERNAL MEDICINE

## 2019-05-09 RX ADMIN — LEVOTHYROXINE SODIUM 100 MCG: 100 TABLET ORAL at 05:26

## 2019-05-09 RX ADMIN — SODIUM CHLORIDE: 9 INJECTION, SOLUTION INTRAVENOUS at 09:37

## 2019-05-09 RX ADMIN — OXYCODONE HYDROCHLORIDE 5 MG: 5 TABLET ORAL at 09:37

## 2019-05-09 RX ADMIN — CEFTRIAXONE SODIUM 2 G: 2 INJECTION, POWDER, FOR SOLUTION INTRAMUSCULAR; INTRAVENOUS at 22:48

## 2019-05-09 RX ADMIN — OXYCODONE HYDROCHLORIDE 5 MG: 5 TABLET ORAL at 02:09

## 2019-05-09 RX ADMIN — METRONIDAZOLE 500 MG: 500 INJECTION, SOLUTION INTRAVENOUS at 23:21

## 2019-05-09 RX ADMIN — DOCUSATE SODIUM 100 MG: 100 CAPSULE, LIQUID FILLED ORAL at 21:45

## 2019-05-09 ASSESSMENT — PAIN DESCRIPTION - PROGRESSION
CLINICAL_PROGRESSION: GRADUALLY WORSENING

## 2019-05-09 ASSESSMENT — PAIN SCALES - GENERAL
PAINLEVEL_OUTOF10: 7
PAINLEVEL_OUTOF10: 7

## 2019-05-09 NOTE — PROGRESS NOTES
[address] [address] paged in regards to MICHAEL output of 575 ml since 1900. Orders received. Paged again at [phone] with another 315 ml output from [address] [place], [place] [address]. Will continue to monitor pt.

## 2019-05-09 NOTE — CONSULTS
[address], [address] [facility]                                  CONSULTATION    PATIENT NAME: Ana Bucio                     :        1960  MED REC NO:   [id number]                          ROOM:       8014  ACCOUNT NO:   [de-identified]                           ADMIT DATE: 2019  PROVIDER:     Bibi Lama MD    CONSULT DATE:  2019    REFERRING PHYSICIAN:  Hilda Connell MD, oncologist.    REASON FOR CONSULTATION:  Recurrent liver lesion in the context of  throat cancer. HISTORY OF PRESENT ILLNESS:  A 27-year-old  male patient who  had past medical history of thyroid disease, liver mass, dysphagia,  squamous cancer in the throat, who was admitted to our hospital a couple  days ago because of acute onset of right flank pain. The patient had  been in his usual health status until last Friday when he developed  acute onset of sharp pain in the right flank. A CT scan of the abdomen  showed hepatomegaly and two dominant hypodense masses, which were 14 cm  and 8 cm. There was also cystic mass in the region of the pancreatic  head, which was 2.6 cm. The MRI two days later showed moderate  enlargement of the liver and five complicated cystic lesions, which were  14.2 cm x 6 cm for the last one and 4.4 cm x 2.9 cm necrotic lymph nodes  around liver, also was near to pancreatic neck, but with separate forms  of pancreas. The patient had undergone IR biopsy, which turned out the  mass in the liver was a cyst and the fluid inside; therefore, the IR had  put drain tube in the cyst.  Today, I noticed there was bloody drainage  from the MICHAEL drain. The patient reports that early this year, he did  have a CT scan, which also showed an 11.5 x 10.1 cm complex predominant  cystic mass in the right side of the liver and he had undergone liver  biopsy. Again, this showed fluid inside of fissure.   At that time, the  patient did not have abdominal pain. Today, the patient only complained of very mild pain around his drainage  spot. The patient denied fever and chills. The patient did not have  fever or chills at home either. The patient denied nausea, vomiting,  heartburn, regurgitation, dysphagia, odynophagia, abdominal bloating,  abdominal gas sensations, or abdominal girth increase. The patient  denied diarrhea, constipation, hematochezia, or melena. The patient  never had upper endoscopy and colonoscopy. However, the patient's stool  test, Cologuard, was negative. 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 The IR drain over the cystic mass showed bloody drainage. 1.  Liver lesions are cystic lesions; however, the drainage was bloody. The patient did not have history of fever or chills. The patient did  have similar cystic lesions in 01/2019. I suspect that the patient may  not have liver abscess. I worry about the patient might have metastatic  lesion in the liver that presented with degeneration or necrotic cyst.   At this time, we are waiting for cytology of the drainage. However, the  sensitivity for cytology was quite low. 2.  The MRI also found that this 4.4 cm x 2.9 cm x 3.8 cm centrally  necrotic portal hepatic lymph node with feature of a cyst and also it  was near to the pancreatic neck. I am wondering if the Oncology would  elect to do biopsies. Those biopsies might give us more information  whether the patient has cancer metastatic to the liver and lymph nodes  in the abdomen. If biopsy is needed, the patient will need to have  EUS-guided biopsies. I would recommend considering transferring to the  [facility].   3.  Elevation of

## 2019-05-09 NOTE — PROGRESS NOTES
He had liver biopsy yesterday and IR put draining tube from liver. After discussing with IR, he suggested ID, surgical and GI evaluation. Pt looks comfortable. Abdominal pain is better with draining. No NVD. 98F 91 16 130/78  96%  AAOX3, no distress. Supple, no JVD. R neck mass stable. S1S2 no murmur. CTA b/l  Soft. BS present. Draining tube noted. No edema. A/p:  I will follow up path report. I get ID, surgery and GI consults as recommended by IR. Thanks.

## 2019-05-09 NOTE — PROGRESS NOTES
Progress Note( Dr. Carmela Bal)  5/9/2019  Subjective:   Admit Date: 5/4/2019  PCP: Brinda Cervantes MD    Admitted For :Nausea vomiting and abdominal pain    Consulted For: Better control of her hypothyroidism    Interval History: Continued abdominal pain had liver biopsy yesterday 5/8/2019  Plenty of clear-appearing fluid is coming out the draining bag. Mode fluid is coming out of the cystic lesion of the liver than expected    Denies any chest pains,   Mild SOB . Denies nausea or vomiting. No new bowel or bladder symptoms. Intake/Output Summary (Last 24 hours) at 5/9/2019 0706  Last data filed at 5/9/2019 0659  Gross per 24 hour   Intake --   Output 1875 ml   Net -1875 ml       DATA    CBC:   Recent Labs     05/09/19  0419   HGB 11.1*    CMP:  No results for input(s): NA, K, CL, CO2, BUN, CREATININE, CALCIUM, PROT, LABALBU, BILITOT, ALKPHOS, AST, ALT in the last 72 hours. Invalid input(s): GLU  Lipids: No results found for: CHOL, HDL, TRIG  Glucose:No results for input(s): POCGLU in the last 72 hours. [id number]:No results found for: LABA1C  High Sensitivity TSH:   Lab Results   Component Value Date    TSHHS 17.390 05/06/2019     Free T3: No results found for: FT3  Free T4:  Lab Results   Component Value Date    T4FREE 1.14 05/07/2019       Us Head Neck Soft Tissue Thyroid    Result Date: 5/7/2019  EXAMINATION: THYROID ULTRASOUND 5/7/2019 COMPARISON: None. HISTORY: ORDERING SYSTEM PROVIDED HISTORY: THYROID DISEASE TECHNOLOGIST PROVIDED HISTORY: Ordering Physician Provided Reason for Exam: Abnormal thyroid labs Acuity: Unknown Type of Exam: Initial FINDINGS: Right thyroid lobe:  4.0 x 0.9 x 1.3 cm Left thyroid lobe:  3.2 x 1.4 x 1.1 cm Isthmus:  4.9 mm Thyroid Gland:  Thyroid gland demonstrates normal echotexture and vascularity. Nodules: No thyroid nodules are present. Cervical lymphadenopathy: No abnormal lymph nodes in the imaged portions of the neck. Unremarkable thyroid ultrasound.      Xr Chest Portable    Result Date: 5/4/2019  EXAMINATION: SINGLE XRAY VIEW OF THE CHEST 5/4/2019 1:0  Low right lung volume with elevation of the right hemidiaphragm and right greater than left basilar atelectasis. No lobar consolidation. Mri Abdomen W Wo Contrast Mrcp    Result Date: 5/6/2019  EXAMINATION: MRI OF THE ABDOMEN WITH AND WITHOUT CONTRAST AND MRCP 5/6/2019 10:31 am   .     1. Moderate enlargement of the liver due to at least five complicated cystic lesions, the largest located in the right hemiliver measuring up to 14.2 cm x 10.6 cm x 14.9 cm. The lesions have thickened, diffusion restricting rims and could represent necrotic metastases or abscesses. Consider ultrasound-guided core biopsy, potentially targeting the rim of the largest lesion. 2. 4.4 cm x 2.9 cm x 3.8 cm centrally necrotic portohepatic lymph node with features similar to the above liver lesions. Considerations include metastatic disease or lymphadenitis. Of note, the finding abuts pancreatic neck but appears separate from the pancreas. This could potentially be biopsied via endoscopy. 3. Borderline to mildly enlarged aortocaval and left para-aortic lymph nodes with most minimal diffusion restriction are likely related to the above processes. 4. Findings of volume overload include new small right and trace left pleural effusions, increased trace ascites, and new minimal to mild anasarca. Cta Chest W Contrast    Result Date: 5/4/2019  EXAMINATION: CTA OF THE CHEST;     1. No acute vascular injury including dissection. 2. Hepatomegaly with interval appearance since 2017 of two dominant hypodense masses measuring up to 14 cm and 8 cm. Differential considerations include metastatic disease or abscess. 3. Cystic mass identified in the region of the pancreatic head measuring 2.6 cm. It is unclear if this lesion is pancreatic in origin or represents a partially necrotic lymph node. Evaluation is limited given the arterial phase of imaging. Comparison to any recent CT scans would be helpful. Dedicated pancreatic CT protocol can also be considered for further evaluation. Of note, no associated pancreatic ductal dilatation or atrophy. 4. No acute intrathoracic abnormality. 5. Emphysema. Cta Abdomen Pelvis W Contrast    Result Date: 5/4/2019  EXAMINATION: CTA OF THE CHEST; CTA OF THE ABDOMEN AND PELVIS WITH CONTRAST, 5/4/2019 1:23 am; 5/4/2019 1:24 am     1. No acute vascular injury including dissection. 2. Hepatomegaly with interval appearance since 2017 of two dominant hypodense masses measuring up to 14 cm and 8 cm. Differential considerations include metastatic disease or abscess. 3. Cystic mass identified in the region of the pancreatic head measuring 2.6 cm. It is unclear if this lesion is pancreatic in origin or represents a partially necrotic lymph node. Evaluation is limited given the arterial phase of imaging. Comparison to any recent CT scans would be helpful. Dedicated pancreatic CT protocol can also be considered for further evaluation. Of note, no associated pancreatic ductal dilatation or atrophy. 4. No acute intrathoracic abnormality. 5. Emphysema.        Scheduled Medicines   Medications:    docusate sodium  100 mg Oral BID    polyethylene glycol  17 g Oral Daily    levothyroxine  100 mcg Oral Daily    sodium chloride flush  10 mL Intravenous BID    sodium chloride flush  10 mL Intravenous 2 times per day    enoxaparin  40 mg Subcutaneous Daily      Infusions:    sodium chloride 50 mL/hr at 05/08/19 1633         Objective:   Vitals: /78   Pulse 91   Temp 98 °F (36.7 °C) (Oral)   Resp 16   Ht 5' 5\" (1.651 m)   Wt 135 lb (61.2 kg)   SpO2 96%   BMI 22.47 kg/m²   General appearance: alert and cooperative with exam  Neck: no JVD or bruit  Thyroid : Normal lobes   Lungs: Has Vesicular Breath sounds   Heart:  regular rate and rhythm  Abdomen: soft, RUQ tender; bowel sounds normal; no masses,  no organomegaly  Musculoskeletal: Normal  Extremities: extremities normal, , no edema  Neurologic:  Awake, alert, oriented to name, place and time. Cranial nerves II-XII are grossly intact. Motor is  intact. Sensory is intact. ,  and gait is normal.    Assessment:     Patient Active Problem List:     Abdominal pain    Hypothyroidism      Oropharyngeal cancer      Liver Cystic  masses      Plan:     1. Reviewed POC blood glucose . Labs and X ray results   2. Reviewed Current Medicines   3. On higher dose of Synthroid     4. Liver biopsy done yesterday of 5/8/2019  5. In for the biopsy report and input from oncology      6. Will follow     .      Dalia Baxter MD

## 2019-05-09 NOTE — PROGRESS NOTES
Hospitalist Progress Note      Name:  Nishi Muse /Age/Sex: 1960  (63 y.o. male)   MRN & CSN:  [id number] & [id number] Admission Date/Time: 2019 12:07 AM   Location:  [address] [place] [address] PCP: Danial Khalil MD         Hospital Day: 6    Assessment and Plan:     Abd pain with Hepatomegaly with hepatic and pancreatic mass with hx of esophageal cancer  -Oncologist on board  -had recent liver biopsy s/p IR draining   IR recommend ID, surgery and GI consult  No fever   -had recent liver biopsy in January for liver mass and no tumor cells or abscess on cytology  -IVF, IV morphine  -received a dose of zoysn in ER and held further abx as had recent liver biopsy with no abscess  - MRI abd with contrast with multiple lesions possible necrotic metastatic vs abscesses.        > h/o Hypothyroidism  > Elevated TSH  Free T4 within normal  - on synthroid  - consult Endo  - dose increased     Acute kidney injury  -IVF, back to baseline.            Diet Dietary Nutrition Supplements: Standard High Calorie Oral Supplement  DIET GENERAL;   DVT Prophylaxis [] Lovenox, []  Heparin, [] SCDs, [] Ambulation   GI Prophylaxis [] PPI,  [] H2 Blocker,  [] Carafate,  [] Diet/Tube Feeds   Code Status Full Code   Disposition Patient requires continued admission due to    MDM [] Low, [] Moderate,[]  High  Patient's risk as above due to      History of Present Illness: The patient was seen and examined at bedside. Patient denied abdominal pain , nausea or vomiting  No fever  Had IR put draining yesterday, seems leaking? ID , surgical and Gi consult as per IR recommendation      Objective: Intake/Output Summary (Last 24 hours) at 2019 1416  Last data filed at 2019 0947  Gross per 24 hour   Intake --   Output 1335 ml   Net -1335 ml      Vitals:   Vitals:    19 1201   BP: 118/77   Pulse: 94   Resp: 17   Temp: 97.4 °F (36.3 °C)   SpO2: 96%     Physical Exam:   GEN Awake.  Alert , not in respiratory distress, not in pain  HEENT: PEERLA, , supple neck,   Chest: air entry equal bilaterally, no wheezing or crepitation  Heart: S1 and S2 heard, no murmur, no gallop or rub, regular rate  Abdomen: soft, ND , Nt, +BS, has tube drain  Extremities: no cyanosis, tenderness or erythema, peripheral pulses audible  Neurology: alert, oriented x3, able to move 4 limbs    Medications:   Medications:    docusate sodium  100 mg Oral BID    polyethylene glycol  17 g Oral Daily    levothyroxine  100 mcg Oral Daily    sodium chloride flush  10 mL Intravenous BID    sodium chloride flush  10 mL Intravenous 2 times per day    enoxaparin  40 mg Subcutaneous Daily      Infusions:    sodium chloride 50 mL/hr at 05/09/19 0937     PRN Meds:   bisacodyl 10 mg Daily PRN   iopamidol 80 mL ONCE PRN   sodium chloride flush 10 mL ONCE PRN   sodium chloride flush 10 mL PRN   magnesium hydroxide 30 mL Daily PRN   ondansetron 4 mg Q6H PRN   morphine 4 mg Q4H PRN   oxyCODONE 5 mg Q4H PRN         Electronically signed by Carolynn Calvillo MD on 5/9/2019 at 2:16 PM

## 2019-05-09 NOTE — CONSULTS
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  ------------------------  REASON FOR CONSULT: Infective syndrome   Requested by: Dr. Cifuentes Edel is a 63 y.o. 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 It was however, preceded by fever on 2 weeks ago. CT abdomen/pelvis and subsequent MRI abdomen revealed multiple fluid collections in the liver and a necrotic lymph node adjacent to the pancreas. Drain in place in the liver. Denies weight loss, nausea, vomiting, or diarrhea. States he has had a colonoscopy previously that was benign. ? Infectious diseases service was consulted to evaluate the pt, and recommend further investigative and therapeutic measures. ROS: Other systems reviewed Including eyes, ENT, respiratory, cardiovascular, GI, , dermatologic, neurologic, psych, hem/lymphatic, musculoskeletal and endocrine were negative other than what is mentioned above.      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)- doing chemo and radiation Dr Victoriano Nye and Dr Ursula Lu done with chemo and radiation- per pt on 3/6/2018    Dysphagia     \"just started 2017- can not tolerate any solid foods- can drink water- protein drinks- baby food\" for peg tube insertion 2017( per pt on 3/6/2018 can eat pretty much anything now so taking the peg tube out)    Fear of needles     HX OTHER MEDICAL     \"passes out whenever they start an IV- takes Ativan and have him smell alcohol wipe- seems to help\"    Liver mass     \"found with my yearly check- for liver bx 2019    Prolonged emergence from general anesthesia     \"took awhile waking him up after last surgery\" per wife    Thyroid disease     \"just hypo thyroid\"      Past Surgical History:   Procedure Laterality Date    DENTAL SURGERY      \"wisdom teeth- put to sleep\"    GASTROSTOMY TUBE PLACEMENT  2017    removed 2017    [address]    yumiko ing hernia repair    LARYNGOSCOPY  2017    direct laryngoscopy with bx of right tonsil and exc of right cervical mass with Dr Alena Hayden      Family History   Problem Relation Age of Onset    Cancer Mother         brain tumor    Cancer Father         throat cancer      Infectious disease related family history - not contibutory. SOCIAL HISTORY  Social History     Tobacco Use    Smoking status: Current Some Day Smoker     Packs/day: 0.50     Years: 11.00     Pack years: 5.50     Types: Cigarettes     Last attempt to quit: 3/30/2017     Years since quittin.1    Smokeless tobacco: Never Used    Tobacco comment: per pt on 2018- 2018 started smoking one or 2 cigarettes per day   Substance Use Topics    Alcohol use: No       Born:  [address] Occupation:   No recent travel of significance.  No recent unusual exposures.  NO pets    ? ALLERGIES  No Known Allergies   MEDICATIONS  Reviewed and are per the chart/EMR. ?   Antibiotics:   ?  -------------------------------------------------------------------------------------------------------------------    Vital Signs:  Vitals: 05/09/19 1201   BP: 118/77   Pulse: 94   Resp: 17   Temp: 97.4 °F (36.3 °C)   SpO2: 96%         Exam:    VS: noted; wt 61.2 kg  Gen: alert and oriented X3, no distress  Skin: no stigmata of endocarditis  Wounds: C/D/I  HEMT: AT/NC Oropharynx pink, moist, and without lesions or exudates; dentition in good state of repair  Eyes: PERRLA, EOMI, conjunctiva pink, sclera anicteric. Neck: Supple. Trachea midline. No LAD. Chest: no distress and CTA. Good air movement. Heart: RRR and no MRG. Abd: drain in RUQ, soft, non-distended, no tenderness, no hepatomegaly. Normoactive bowel sounds. Ext: no clubbing, cyanosis, or edema  Catheter Site: without erythema or tenderness  Neuro: Mental status intact. CN 2-12 intact and no focal sensory or motor deficits    ? Diagnostic Studies: reviewed  ? ? I have examined this patient and available medical records on this date and have made the above observations, conclusions and recommendations.   Electronically signed by: Electronically signed by Toby Faith MD on 5/9/2019 at 3:15 PM

## 2019-05-09 NOTE — CONSULTS
CONSULTATION     PATIENT NAME: Munira Guevara                     :        1960  MED REC NO:   [id number]                          ROOM:       4127  ACCOUNT NO:   [de-identified]                           ADMIT DATE: 2019  PROVIDER:     Shiela Ford MD     CONSULT DATE:  2019     REFERRING PHYSICIAN:  Sary Salgado MD, oncologist.     REASON FOR CONSULTATION:  Recurrent liver lesion in the context of throat cancer.     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 The patient had been in his usual health status until last Friday when he developed acute onset of sharp pain in the right flank. A CT scan of the abdomen showed hepatomegaly and two dominant hypodense masses, which were 14 cm and 8 cm. There was also cystic mass in the region of the pancreatic head, which was 2.6 cm. The MRI two days later showed moderate enlargement of the liver and five complicated cystic lesions, which were 14.2 cm x 6 cm for the last one and 4.4 cm x 2.9 cm necrotic lymph nodes around liver, also was near to pancreatic neck, but with separate from  pancreas. The patient had undergone IR biopsy, which turned out that the mass in the liver was a cyst and there was fluid inside; therefore, the IR had put a drain in the cyst.  Today, I noticed there was bloody drainage from the MICHAEL drain. The patient reports that early this year, he did have a CT scan, which also showed an 11.5 x 10.1 cm complex predominant cystic mass in the right side of the liver and he had undergone liver biopsy. Again, this showed fluid inside of fissure.   At that time, the patient did not have abdominal pain.     Today, the patient only complained of very mild pain around his drainage spot. The patient denied fever and chills. The patient did not have fever or chills at home either. The patient denied nausea, vomiting, heartburn, regurgitation, dysphagia, odynophagia, abdominal bloating, abdominal gas sensations, or abdominal girth increase. The patient denied diarrhea, constipation, hematochezia, or melena. The patient never had upper endoscopy and colonoscopy. However, the patient's stool test, Cologuard, was negative. 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)- doing chemo and radiation Dr Chi Polanco and Dr Trevor Benitez done with chemo and radiation- per pt on 3/6/2018    Dysphagia     \"just started 8/7/2017- can not tolerate any solid foods- can drink water- protein drinks- baby food\" for peg tube insertion 8/16/2017( per pt on 3/6/2018 can eat pretty much anything now so taking the peg tube out)    Fear of needles     HX OTHER MEDICAL     \"passes out whenever they start an IV- takes Ativan and have him smell alcohol wipe- seems to help\"    Liver mass     \"found with my yearly check- for liver bx 1/17/2019    Prolonged emergence from general anesthesia     \"took awhile waking him up after last surgery\" per wife    Thyroid disease     \"just hypo thyroid\"       PSH:    Past Surgical History:   Procedure Laterality Date    DENTAL SURGERY  1990's    \"wisdom teeth- put to sleep\"    GASTROSTOMY TUBE PLACEMENT  08/2017    removed 11/2017   [address]    yumiko ing hernia repair    LARYNGOSCOPY  05/25/2017    direct laryngoscopy with bx of right tonsil and exc of right cervical mass with Dr Maikel Fournier       Medications:    No current facility-administered medications on file prior to encounter. No current outpatient medications on file prior to encounter.        Allergies: No Known Allergies    Social History:    Social History     Socioeconomic History    Marital status:      Spouse name: Not on file    Number of children: Not on file    Years of education: Not on file    Highest education level: Not on file   Occupational History    Not on file   Social Needs    Financial resource strain: Not on file    Food insecurity:     Worry: Not on file     Inability: Not on file    Transportation needs:     Medical: Not on file     Non-medical: Not on file   Tobacco Use    Smoking status: Current Some Day Smoker     Packs/day: 0.50     Years: 11.00     Pack years: 5.50     Types: Cigarettes     Last attempt to quit: 3/30/2017     Years since quittin.1    Smokeless tobacco: Never Used    Tobacco comment: per pt on 2018- 2018 started smoking one or 2 cigarettes per day   Substance and Sexual Activity    Alcohol use: No    Drug use: Yes     Types: Marijuana     Comment: last used 3/5/2018\"average one per night\"\"helps with my appetitie\"    Sexual activity: Not on file   Lifestyle    Physical activity:     Days per week: Not on file     Minutes per session: Not on file    Stress: Not on file   Relationships    Social connections:     Talks on phone: Not on file     Gets together: Not on file     Attends Taoism service: Not on file     Active member of club or organization: Not on file     Attends meetings of clubs or organizations: Not on file     Relationship status: Not on file    Intimate partner violence:     Fear of current or ex partner: Not on file     Emotionally abused: Not on file     Physically abused: Not on file     Forced sexual activity: Not on file   Other Topics Concern    Not on file   Social History Narrative    Not on file       Family History:        Problem Relation Age of Onset    Cancer Mother         brain tumor    Cancer Father         throat cancer         Review of Systems:  Constitutional: No weight loss, no fevers. Eyes: No problems with vision.   ENT: No nose or sinus problems, no oral problems, no throat problems or hoarseness. Cardiovascular: No chest pain, no leg pain with walking, no palpitations, no ankle swelling. Respiratory: No shortness of breath, no persistent cough, no wheezing. Endocrine: No increased thirst, no increased urination. Gastrointestinal: No heartburn, no dysphagia, + abdominal pain, no loss of appetite, no nausea or vomiting, no diarrhea, no constipation, no melena, no hematochezia, no sceleral icterus or jaundice. Skin: No rashes. Musculoskeletal: No trouble walking or standing, no joint pain, no muscle pain. Allergy/Immune System: No allergies, no frequent infections. Neurological: No memory difficulties, no temporary blindness, no difficulty speaking, no headaches, no numbness. Psychiatric: No depression, no suicidal ideation, no auditory hallucinations. Hematological/Lymphatic: No lymphadenopathy, no frequent nose bleeds, no easy bruising. Genitourinary: No penile/vaginal discharge, no pain with urination, no trouble starting urinary stream, no hematuria. Physical Examination  Vital Signs: /77   Pulse 94   Temp 97.4 °F (36.3 °C) (Oral)   Resp 17   Ht 5' 5\" (1.651 m)   Wt 135 lb (61.2 kg)   SpO2 96%   BMI 22.47 kg/m²  Body mass index is 22.47 kg/m². General: The patient is a 63 y.o. male in No acute distress. EYE: EOMI, Gross visual field was normal. Pupils reactive, The conjunctive was normal, with no erythema. ENT: no lymphadenopathy, oropharynx is without erythema, edema, or exudates, and moist mucus membranes. The nasal mucosa, septum and turbinates were normal without inflammation or edema. Neck: There was no mass on palpitation, tracheal position was in the middle of the neck and there was no enlarged thyroid. There was no JVD. Lungs: The respiratory was not in labor and the patient did not use accessory muscle. Clear to auscultation bilaterally, no wheeze/crackles.   Cardiovascular: Regular rate and rhythm, normal S1 & S2, no murmurs, rubs or gallops appreciated. Peripheral pulses were normal and no tenderness. Abdomen: Soft, mild tender on the RUQ and around the MICHAEL drain, no rebound or guarding or peritoneal features, no masses, no hepatosplenomegaly. Extremities: upper and lower extremities were warm and dry, no clubbing, cyanosis, edema. Neuro: CN II-XII were intact grossly. Sensation was normal on all extremities and the muscle strength was normal and symmetry. Rectum:  There was no fistular, fissure, external hemorrhoid, tenderness, abscess, erythema or discharge    Labs:  CBC  WBC   Date Value Ref Range Status   05/05/2019 12.7 (H) 4.0 - 10.5 K/CU MM Final     Hemoglobin   Date Value Ref Range Status   05/09/2019 11.1 (L) 13.5 - 18.0 GM/DL Final     Hematocrit   Date Value Ref Range Status   05/09/2019 35.3 (L) 42 - 52 % Final     MCV   Date Value Ref Range Status   05/05/2019 98.1 78 - 100 FL Final        Glucose   Date Value Ref Range Status   05/06/2019 95 70 - 99 MG/DL Final     CO2   Date Value Ref Range Status   05/06/2019 22 21 - 32 MMOL/L Final     BUN   Date Value Ref Range Status   05/06/2019 15 6 - 23 MG/DL Final     Lab Results   Component Value Date    ALT 20 05/06/2019    AST 48 05/06/2019     No results found for: AMYLASE  Lab Results   Component Value Date    LIPASE 43 05/04/2019     No results found for: ESR  No components found for: CREACTIVEPR  No results found for: JOHN No components found for: ANTISMA, ANTIMITO  No results found for: CEA  No components found for: OCCULTBLOOD, OCCBLDSINPOC, OCCULTBLOODS, OCCBLD1, OCCBLD2, OCCBLD3, OCCULTBLOOD  Lab Results   Component Value Date    IRON 26 05/06/2019    FERRITIN 690 05/06/2019     No results found for: HAV    Assessment     Assessment and Plan:    A 43-year-old  male patient who has a past medical history of metastatic high-grade squamous cancer in the throat, dysphagia, liver mass, thyroid disease, presented with acute onset of right-sided flank pain, which was due to possible liver cystic mass. The IR drain over the cystic mass showed bloody drainage. 1.  Liver lesions are cystic lesions; however, the drainage was bloody. The patient did not have history of fever or chills. The patient did have similar cystic lesions in 01/2019. I suspected that the patient may not have liver abscess. I worry about the patient might have metastatic lesion in the liver that presented with degeneration or necrotic cyst.   At this time, we are waiting for cytology of the drainage. However, the sensitivity for cytology was quite low. 2.  The MRI also found that this 4.4 cm x 2.9 cm x 3.8 cm centrally necrotic portal hepatic lymph node with feature of a cyst and also it was near to the pancreatic neck. I am wondering if the Oncology would like to do biopsies. Those biopsies might give us more information whether the patient has cancer metastatic to the liver and lymph nodes in the abdomen. If biopsy is needed, the patient will need to have EUS-guided biopsies. I would recommend considering transferring to the [facility]. 3.  Elevation of alkaline phosphatase and AST with concern of metastatic cancer in the liver. I would recommend following with oncologist.    4.  Mild anemia, might be secondary to underlying cancer. The Oncology/Hematology has been following the patient. The patient's ferritin was more than 100; therefore, unlikely, the patient had iron-deficiency anemia. The patient did not have signs of GI bleeding either.     Thank you very much for referring this interesting case.           Danny Mcqueen MD PhD [facility]  [address] [place] [address] [address]  0ffice: [phone]  Fax: [phone]

## 2019-05-10 VITALS
HEART RATE: 109 BPM | WEIGHT: 135 LBS | RESPIRATION RATE: 16 BRPM | SYSTOLIC BLOOD PRESSURE: 126 MMHG | TEMPERATURE: 98.2 F | BODY MASS INDEX: 22.49 KG/M2 | OXYGEN SATURATION: 96 % | HEIGHT: 65 IN | DIASTOLIC BLOOD PRESSURE: 72 MMHG

## 2019-05-10 LAB
HCT VFR BLD CALC: 32.2 % (ref 42–52)
HCT VFR BLD CALC: 33.6 % (ref 42–52)
HCT VFR BLD CALC: 35.1 % (ref 42–52)
HCT VFR BLD CALC: 36.2 % (ref 42–52)
HEMOGLOBIN: 10.3 GM/DL (ref 13.5–18)
HEMOGLOBIN: 10.6 GM/DL (ref 13.5–18)
HEMOGLOBIN: 10.9 GM/DL (ref 13.5–18)
HEMOGLOBIN: 11.1 GM/DL (ref 13.5–18)
MAGNESIUM: 2.1 MG/DL (ref 1.8–2.4)
MCH RBC QN AUTO: 28.9 PG (ref 27–31)
MCHC RBC AUTO-ENTMCNC: 31.5 % (ref 32–36)
MCV RBC AUTO: 91.6 FL (ref 78–100)
PDW BLD-RTO: 13.8 % (ref 11.7–14.9)
PHOSPHORUS: 3.6 MG/DL (ref 2.5–4.9)
PLATELET # BLD: 372 K/CU MM (ref 140–440)
PMV BLD AUTO: 9.4 FL (ref 7.5–11.1)
RBC # BLD: 3.67 M/CU MM (ref 4.6–6.2)
WBC # BLD: 13.8 K/CU MM (ref 4–10.5)

## 2019-05-10 PROCEDURE — 99232 SBSQ HOSP IP/OBS MODERATE 35: CPT | Performed by: INTERNAL MEDICINE

## 2019-05-10 PROCEDURE — 85027 COMPLETE CBC AUTOMATED: CPT

## 2019-05-10 PROCEDURE — 36415 COLL VENOUS BLD VENIPUNCTURE: CPT

## 2019-05-10 PROCEDURE — 6370000000 HC RX 637 (ALT 250 FOR IP): Performed by: HOSPITALIST

## 2019-05-10 PROCEDURE — 6370000000 HC RX 637 (ALT 250 FOR IP): Performed by: NURSE PRACTITIONER

## 2019-05-10 PROCEDURE — 84100 ASSAY OF PHOSPHORUS: CPT

## 2019-05-10 PROCEDURE — 94761 N-INVAS EAR/PLS OXIMETRY MLT: CPT

## 2019-05-10 PROCEDURE — 2500000003 HC RX 250 WO HCPCS: Performed by: INTERNAL MEDICINE

## 2019-05-10 PROCEDURE — 6360000002 HC RX W HCPCS: Performed by: HOSPITALIST

## 2019-05-10 PROCEDURE — 1200000000 HC SEMI PRIVATE

## 2019-05-10 PROCEDURE — 6370000000 HC RX 637 (ALT 250 FOR IP): Performed by: INTERNAL MEDICINE

## 2019-05-10 PROCEDURE — 83735 ASSAY OF MAGNESIUM: CPT

## 2019-05-10 PROCEDURE — 85014 HEMATOCRIT: CPT

## 2019-05-10 PROCEDURE — 2580000003 HC RX 258: Performed by: HOSPITALIST

## 2019-05-10 PROCEDURE — 85018 HEMOGLOBIN: CPT

## 2019-05-10 RX ADMIN — OXYCODONE HYDROCHLORIDE 5 MG: 5 TABLET ORAL at 17:18

## 2019-05-10 RX ADMIN — OXYCODONE HYDROCHLORIDE 5 MG: 5 TABLET ORAL at 08:27

## 2019-05-10 RX ADMIN — SODIUM CHLORIDE: 9 INJECTION, SOLUTION INTRAVENOUS at 05:06

## 2019-05-10 RX ADMIN — BISACODYL 10 MG: 10 SUPPOSITORY RECTAL at 11:41

## 2019-05-10 RX ADMIN — METRONIDAZOLE 500 MG: 500 INJECTION, SOLUTION INTRAVENOUS at 08:13

## 2019-05-10 RX ADMIN — ONDANSETRON 4 MG: 2 INJECTION INTRAMUSCULAR; INTRAVENOUS at 22:30

## 2019-05-10 RX ADMIN — DOCUSATE SODIUM 100 MG: 100 CAPSULE, LIQUID FILLED ORAL at 21:05

## 2019-05-10 RX ADMIN — POLYETHYLENE GLYCOL (3350) 17 G: 17 POWDER, FOR SOLUTION ORAL at 08:13

## 2019-05-10 RX ADMIN — DOCUSATE SODIUM 100 MG: 100 CAPSULE, LIQUID FILLED ORAL at 08:13

## 2019-05-10 RX ADMIN — OXYCODONE HYDROCHLORIDE 5 MG: 5 TABLET ORAL at 01:01

## 2019-05-10 RX ADMIN — LEVOTHYROXINE SODIUM 100 MCG: 100 TABLET ORAL at 05:00

## 2019-05-10 ASSESSMENT — PAIN DESCRIPTION - PROGRESSION
CLINICAL_PROGRESSION: GRADUALLY WORSENING

## 2019-05-10 ASSESSMENT — PAIN DESCRIPTION - FREQUENCY: FREQUENCY: INTERMITTENT

## 2019-05-10 ASSESSMENT — PAIN DESCRIPTION - PAIN TYPE
TYPE: SURGICAL PAIN
TYPE: SURGICAL PAIN

## 2019-05-10 ASSESSMENT — PAIN SCALES - GENERAL
PAINLEVEL_OUTOF10: 2
PAINLEVEL_OUTOF10: 7
PAINLEVEL_OUTOF10: 6
PAINLEVEL_OUTOF10: 6

## 2019-05-10 ASSESSMENT — PAIN DESCRIPTION - ORIENTATION
ORIENTATION: RIGHT
ORIENTATION: RIGHT

## 2019-05-10 ASSESSMENT — PAIN DESCRIPTION - DESCRIPTORS: DESCRIPTORS: OTHER (COMMENT)

## 2019-05-10 ASSESSMENT — PAIN DESCRIPTION - LOCATION
LOCATION: ABDOMEN
LOCATION: ABDOMEN

## 2019-05-10 NOTE — CARE COORDINATION
Reviewed chart for continued discharge planning. It appears now plan is to transfer pt to [facility]. Provided Dr. Taylor Cokre the transfer center number to facilitate if needed. No further CM needs id'd.

## 2019-05-10 NOTE — PROGRESS NOTES
I appreciated input of all consultants. Dr Franca Mckinnon, GI, recommended transfer to [address] [place] [address] for further w/u. Draining has slowed down. He is comfortable this morning. They are OK with transfer. Thanks.

## 2019-05-10 NOTE — PROGRESS NOTES
Vitals:    05/10/19 0537   BP: (!) 142/84   Pulse: 107   Resp: 18   Temp: 97.9 °F (36.6 °C)   SpO2: 97%     Physical Exam:   GEN Awake.  Alert , not in respiratory distress, not in pain  HEENT: PEERLA, , supple neck,   Chest: air entry equal bilaterally, no wheezing or crepitation  Heart: S1 and S2 heard, no murmur, no gallop or rub, regular rate  Abdomen: soft, ND , Nt, +BS, has tube drain  Extremities: no cyanosis, tenderness or erythema, peripheral pulses audible  Neurology: alert, oriented x3, able to move 4 limbs    Medications:   Medications:    cefTRIAXone (ROCEPHIN) IV  2 g Intravenous Q24H    metroNIDAZOLE  500 mg Intravenous Q8H    docusate sodium  100 mg Oral BID    polyethylene glycol  17 g Oral Daily    levothyroxine  100 mcg Oral Daily    sodium chloride flush  10 mL Intravenous BID    sodium chloride flush  10 mL Intravenous 2 times per day    enoxaparin  40 mg Subcutaneous Daily      Infusions:    sodium chloride 50 mL/hr at 05/10/19 0506     PRN Meds:     bisacodyl 10 mg Daily PRN   iopamidol 80 mL ONCE PRN   sodium chloride flush 10 mL ONCE PRN   sodium chloride flush 10 mL PRN   magnesium hydroxide 30 mL Daily PRN   ondansetron 4 mg Q6H PRN   morphine 4 mg Q4H PRN   oxyCODONE 5 mg Q4H PRN         Electronically signed by Alyse Alcala MD on 5/10/2019 at 8:07 AM

## 2019-05-10 NOTE — DISCHARGE SUMMARY
Discharge Summary    Name:  Wendy Gore /Age/Sex: 1960  (63 y.o. male)   MRN & CSN:  [id number] & [id number] Admission Date/Time: 2019 12:07 AM   Attending:  Florian Rollins MD Discharging Physician: Florian Rollins MD     Hospital Course:   Wendy Gore is a 63 y.o.  male  who presents with <principal problem not specified>    63 y.o.  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 CT abdomen/pelvis and subsequent MRI abdomen revealed multiple fluid collections in the liver and a necrotic lymph nodewith feature of a cyst and also it was near to the pancreatic neck. IR Drain was placed in the liver. ID consulted recommend empirical IV AB rocephin and flagyl, follow blood cx and cytology of drained fluid. GI was consulted who recommends transfer patient to [facility] for higher level of care (EUS guided  biopsy of necrotic LN ). The patient expressed appropriate understanding of and agreement with the transfer  recommendations, medications, and plan.      Consults this admission:  IP CONSULT TO HOSPITALIST  IP CONSULT TO ONCOLOGY  IP CONSULT TO ONCOLOGY  IP CONSULT TO ENDOCRINOLOGY  IP CONSULT TO INTERVENTIONAL RADIOLOGY  IP CONSULT TO GI  IP CONSULT TO GENERAL SURGERY  IP CONSULT TO INFECTIOUS DISEASES    Discharge Instruction:       Diet:    Activity:   Disposition: Discharged to:   []Home, [][facility], []SNF, []Acute Rehab, []Hospice   Condition on discharge: Stable    Discharge Medications:      Alexandre Flores   Home Medication Instructions KEYANNA:[id number]    Printed on:05/10/19 1443   Medication Information                      levothyroxine (SYNTHROID) 100 MCG tablet  Take 1 tablet by mouth Daily             oxyCODONE (ROXICODONE) 5 MG immediate release tablet  Take 1 tablet by mouth every 8 hours as needed for Pain for up to 3 days. Objective Findings at Discharge:   /77   Pulse 113   Temp 98.1 °F (36.7 °C) (Oral)   Resp 17   Ht 5' 5\" (1.651 m)   Wt 135 lb (61.2 kg)   SpO2 95%   BMI 22.47 kg/m²            PHYSICAL EXAM   GEN    Awake.  Alert , not in respiratory distress, not in pain  HEENT: PEERLA, , supple neck,   Chest: air entry equal bilaterally, no wheezing or crepitation  Heart: S1 and S2 heard, no murmur, no gallop or rub, regular rate  Abdomen: soft, ND , Nt, +BS, has tube drain  Extremities: no cyanosis, tenderness or erythema, peripheral pulses audible  Neurology: alert, oriented x3, able to move 4 limbs      BMP/CBC  Recent Labs     05/09/19  1434 05/09/19  2329 05/10/19  0702     --   --    K 4.2  --   --      --   --    CO2 23  --   --    BUN 21  --   --    CREATININE 0.8*  --   --    WBC 11.6*  --  13.8*   HCT 33.8* 35.1* 33.6*     --  372       IMAGING:      Discharge Time of 35 minutes    Electronically signed by Andrew Peters MD on 5/10/2019 at 2:41 PM

## 2019-05-10 NOTE — PROGRESS NOTES
Infectious Disease Progress Note  5/10/2019   Patient Name: Mita Muñoz : 1960   Impression  · Multiple Liver fluid cysts  ? Hx of tonsillar invasive SC Ca s/p cisplatin and radiation therapy   § Blood culture negative  § Agree with GI, unlikely to be liver abscess  · Multi-morbidity: per PMHx  Plan:  · D/c ceftriaxone and metronidazole  ? F/u culture and blood cultures  ? Check CRP, ESR  · Cytology of drained fluid would be helpful      Ongoing Antimicrobial Therapy  ? Completed Antimicrobial Therapy  Ceftriaxone   Metronidazole ? History:? Interval history noted, multiple liver cysts  Denies n/v/d/f or untoward effects of antibiotics  Physical Exam:  Vital Signs: BP (!) 142/84   Pulse 107   Temp 97.9 °F (36.6 °C) (Oral)   Resp 18   Ht 5' 5\" (1.651 m)   Wt 135 lb (61.2 kg)   SpO2 97%   BMI 22.47 kg/m²     Gen: alert and oriented X3, no distress  Skin: no stigmata of endocarditis  Wounds: C/D/I  HEMT: AT/NC Oropharynx pink, moist, and without lesions or exudates; dentition in good state of repair  Eyes: PERRLA, EOMI, conjunctiva pink, sclera anicteric. Neck: Supple. Trachea midline. 4 cm, firm mass at the right side of neck inferior to the angle of the jaw. Chest: no distress and CTA. Good air movement. Heart: RRR and no MRG. Abd: drain in RUQ, soft, non-distended, no tenderness, no hepatomegaly. Normoactive bowel sounds. Ext: no clubbing, cyanosis, or edema  Catheter Site: without erythema or tenderness  Neuro: Mental status intact. CN 2-12 intact and no focal sensory or motor deficits         Radiologic / Imaging / TESTING  No results found.      Labs:    Recent Results (from the past 24 hour(s))   CBC    Collection Time: 19  2:34 PM   Result Value Ref Range    WBC 11.6 (H) 4.0 - 10.5 K/CU MM    RBC 3.61 (L) 4.6 - 6.2 M/CU MM    Hemoglobin 10.6 (L) 13.5 - 18.0 GM/DL    Hematocrit 33.8 (L) 42 - 52 %    MCV 93.6 78 - 100 FL    MCH 29.4 27 - 31 PG    MCHC 31.4 (L) 32.0 - 36.0 % RDW 13.9 11.7 - 14.9 %    Platelets 856 273 - 416 K/CU MM    MPV 9.2 7.5 - 11.1 FL   Comprehensive Metabolic Panel    Collection Time: 05/09/19  2:34 PM   Result Value Ref Range    Sodium 135 135 - 145 MMOL/L    Potassium 4.2 3.5 - 5.1 MMOL/L    Chloride 100 99 - 110 mMol/L    CO2 23 21 - 32 MMOL/L    BUN 21 6 - 23 MG/DL    CREATININE 0.8 (L) 0.9 - 1.3 MG/DL    Glucose 152 (H) 70 - 99 MG/DL    Calcium 8.6 8.3 - 10.6 MG/DL    Alb 3.2 (L) 3.4 - 5.0 GM/DL    Total Protein 5.7 (L) 6.4 - 8.2 GM/DL    Total Bilirubin 0.5 0.0 - 1.0 MG/DL    ALT 20 10 - 40 U/L    AST 34 15 - 37 IU/L    Alkaline Phosphatase 273 (H) 40 - 128 IU/L    GFR Non-African American >60 >60 mL/min/1.73m2    GFR African American >60 >60 mL/min/1.73m2    Anion Gap 12 4 - 16   Phosphorus    Collection Time: 05/09/19  2:34 PM   Result Value Ref Range    Phosphorus 3.2 2.5 - 4.9 MG/DL   Hemoglobin and hematocrit, blood    Collection Time: 05/09/19 11:29 PM   Result Value Ref Range    Hemoglobin 11.1 (L) 13.5 - 18.0 GM/DL    Hematocrit 35.1 (L) 42 - 52 %   CBC    Collection Time: 05/10/19  7:02 AM   Result Value Ref Range    WBC 13.8 (H) 4.0 - 10.5 K/CU MM    RBC 3.67 (L) 4.6 - 6.2 M/CU MM    Hemoglobin 10.6 (L) 13.5 - 18.0 GM/DL    Hematocrit 33.6 (L) 42 - 52 %    MCV 91.6 78 - 100 FL    MCH 28.9 27 - 31 PG    MCHC 31.5 (L) 32.0 - 36.0 %    RDW 13.8 11.7 - 14.9 %    Platelets 854 463 - 265 K/CU MM    MPV 9.4 7.5 - 11.1 FL     CULTURE results: Invalid input(s): BLOOD CULTURE,  URINE CULTURE, SURGICAL CULTURE    Diagnosis:  Patient Active Problem List   Diagnosis    Flank pain    Liver mass    Abnormal LFTs    Pancreatic mass       Active Problems  Active Problems:    Flank pain    Liver mass    Abnormal LFTs    Pancreatic mass  Resolved Problems:    * No resolved hospital problems.  *    Electronically signed by: Electronically signed by Rossy Yates MD on 5/10/2019 at 7:51 AM

## 2019-05-10 NOTE — PROGRESS NOTES
Oncologist requests hospitalist to call [facility] to arrange for transfer  I did call many times and could not reach them out  Will try later on

## 2019-05-10 NOTE — PROGRESS NOTES
Progress Note( Dr. Centeno Theo)  5/10/2019  Subjective:   Admit Date: 5/4/2019  PCP: Bonita Dove MD    Admitted For :Nausea vomiting and abdominal pain    Consulted For: Better control of her hypothyroidism    Interval History: Continued abdominal pain had liver biopsy yesterday 5/8/2019  Plenty of blood tinged  fluid is coming out the draining bag. Mode fluid is coming out of the cystic lesion of the liver than expected    Denies any chest pains,   Mild SOB . Denies nausea or vomiting. No new bowel or bladder symptoms. Intake/Output Summary (Last 24 hours) at 5/10/2019 0755  Last data filed at 5/10/2019 0729  Gross per 24 hour   Intake --   Output 1060 ml   Net -1060 ml       DATA    CBC:   Recent Labs     05/09/19  1434 05/09/19  2329 05/10/19  0702   WBC 11.6*  --  13.8*   HGB 10.6* 11.1* 10.6*     --  372    CMP:  Recent Labs     05/09/19  1434      K 4.2      CO2 23   BUN 21   CREATININE 0.8*   CALCIUM 8.6   PROT 5.7*   LABALBU 3.2*   BILITOT 0.5   ALKPHOS 273*   AST 34   ALT 20     Lipids: No results found for: CHOL, HDL, TRIG  Glucose:No results for input(s): POCGLU in the last 72 hours. ApoepxoazbR5D:No results found for: LABA1C  High Sensitivity TSH:   Lab Results   Component Value Date    TSHHS 17.390 05/06/2019     Free T3: No results found for: FT3  Free T4:  Lab Results   Component Value Date    T4FREE 1.14 05/07/2019       Us Head Neck Soft Tissue Thyroid    Result Date: 5/7/2019  EXAMINATION: THYROID ULTRASOUND 5/7/2019 COMPARISON: None. HISTORY: ORDERING SYSTEM PROVIDED HISTORY: THYROID DISEASE TECHNOLOGIST PROVIDED HISTORY: Ordering Physician Provided Reason for Exam: Abnormal thyroid labs Acuity: Unknown Type of Exam: Initial FINDINGS: Right thyroid lobe:  4.0 x 0.9 x 1.3 cm Left thyroid lobe:  3.2 x 1.4 x 1.1 cm Isthmus:  4.9 mm Thyroid Gland:  Thyroid gland demonstrates normal echotexture and vascularity. Nodules: No thyroid nodules are present.  Cervical lymphadenopathy: No abnormal lymph nodes in the imaged portions of the neck. Unremarkable thyroid ultrasound. Xr Chest Portable    Result Date: 5/4/2019  EXAMINATION: SINGLE XRAY VIEW OF THE CHEST 5/4/2019 1:0  Low right lung volume with elevation of the right hemidiaphragm and right greater than left basilar atelectasis. No lobar consolidation. Mri Abdomen W Wo Contrast Mrcp    Result Date: 5/6/2019  EXAMINATION: MRI OF THE ABDOMEN WITH AND WITHOUT CONTRAST AND MRCP 5/6/2019 10:31 am   .     1. Moderate enlargement of the liver due to at least five complicated cystic lesions, the largest located in the right hemiliver measuring up to 14.2 cm x 10.6 cm x 14.9 cm. The lesions have thickened, diffusion restricting rims and could represent necrotic metastases or abscesses. Consider ultrasound-guided core biopsy, potentially targeting the rim of the largest lesion. 2. 4.4 cm x 2.9 cm x 3.8 cm centrally necrotic portohepatic lymph node with features similar to the above liver lesions. Considerations include metastatic disease or lymphadenitis. Of note, the finding abuts pancreatic neck but appears separate from the pancreas. This could potentially be biopsied via endoscopy. 3. Borderline to mildly enlarged aortocaval and left para-aortic lymph nodes with most minimal diffusion restriction are likely related to the above processes. 4. Findings of volume overload include new small right and trace left pleural effusions, increased trace ascites, and new minimal to mild anasarca. Cta Chest W Contrast    Result Date: 5/4/2019  EXAMINATION: CTA OF THE CHEST;     1. No acute vascular injury including dissection. 2. Hepatomegaly with interval appearance since 2017 of two dominant hypodense masses measuring up to 14 cm and 8 cm. Differential considerations include metastatic disease or abscess. 3. Cystic mass identified in the region of the pancreatic head measuring 2.6 cm.   It is unclear if this lesion is pancreatic in origin or represents a partially necrotic lymph node. Evaluation is limited given the arterial phase of imaging. Comparison to any recent CT scans would be helpful. Dedicated pancreatic CT protocol can also be considered for further evaluation. Of note, no associated pancreatic ductal dilatation or atrophy. 4. No acute intrathoracic abnormality. 5. Emphysema. Cta Abdomen Pelvis W Contrast    Result Date: 5/4/2019  EXAMINATION: CTA OF THE CHEST; CTA OF THE ABDOMEN AND PELVIS WITH CONTRAST, 5/4/2019 1:23 am; 5/4/2019 1:24 am     1. No acute vascular injury including dissection. 2. Hepatomegaly with interval appearance since 2017 of two dominant hypodense masses measuring up to 14 cm and 8 cm. Differential considerations include metastatic disease or abscess. 3. Cystic mass identified in the region of the pancreatic head measuring 2.6 cm. It is unclear if this lesion is pancreatic in origin or represents a partially necrotic lymph node. Evaluation is limited given the arterial phase of imaging. Comparison to any recent CT scans would be helpful. Dedicated pancreatic CT protocol can also be considered for further evaluation. Of note, no associated pancreatic ductal dilatation or atrophy. 4. No acute intrathoracic abnormality. 5. Emphysema.        Scheduled Medicines   Medications:    cefTRIAXone (ROCEPHIN) IV  2 g Intravenous Q24H    metroNIDAZOLE  500 mg Intravenous Q8H    docusate sodium  100 mg Oral BID    polyethylene glycol  17 g Oral Daily    levothyroxine  100 mcg Oral Daily    sodium chloride flush  10 mL Intravenous BID    sodium chloride flush  10 mL Intravenous 2 times per day    enoxaparin  40 mg Subcutaneous Daily      Infusions:    sodium chloride 50 mL/hr at 05/10/19 0506         Objective:   Vitals: BP (!) 142/84   Pulse 107   Temp 97.9 °F (36.6 °C) (Oral)   Resp 18   Ht 5' 5\" (1.651 m)   Wt 135 lb (61.2 kg)   SpO2 97%   BMI 22.47 kg/m²   General appearance: alert and cooperative with exam  Neck: no JVD or bruit  Thyroid : Normal lobes   Lungs: Has Vesicular Breath sounds   Heart:  regular rate and rhythm  Abdomen: soft, RUQ tender; bowel sounds normal; no masses,  no organomegaly  Musculoskeletal: Normal  Extremities: extremities normal, , no edema  Neurologic:  Awake, alert, oriented to name, place and time. Cranial nerves II-XII are grossly intact. Motor is  intact. Sensory is intact. ,  and gait is normal.    Assessment:     Patient Active Problem List:     Abdominal pain    Hypothyroidism      Oropharyngeal cancer      Liver Cystic  masses      Plan:     1. Reviewed POC blood glucose . Labs and X ray results   2. Reviewed Current Medicines   3. On higher dose of Synthroid     4. Liver biopsy done yesterday of 5/8/2019  5. In for the biopsy report and input from oncology    6. Reviewed various consultant's notes   7. Pt is being transferred to [facility]  For further care   8.  will sign off    9. Will follow when comes back    .      Sofia Parada MD

## 2019-05-10 NOTE — PROGRESS NOTES
C/s was placed to General Surgery from Heme/Onc. I reviewed chart and images. Pt with possible cystic pancreatic mass and questionable surrounding involved lymph nodes as well as liver lesions / abscesses. Sent Dr. Vesna Jaeger message via [employer] recommending pt be transferred to tertiary center with [facility] Surgery. Pt was also evaluated here by GI who also recommended transfer. No need for formal General Surgery c/s as pt is being transferred and will not have much to add to his current care.     [address] [place] [address]

## 2019-05-10 NOTE — PLAN OF CARE
Problem: Falls - Risk of:  Goal: Will remain free from falls  Description  Will remain free from falls  5/10/2019 1526 by Chepe Duncan RN  Outcome: Ongoing  5/10/2019 0219 by Talon Abernathy RN  Outcome: Ongoing  Goal: Absence of physical injury  Description  Absence of physical injury  5/10/2019 1526 by Chepe Duncan RN  Outcome: Ongoing  5/10/2019 0219 by Talon Abernathy RN  Outcome: Ongoing     Problem: Infection:  Goal: Will remain free from infection  Description  Will remain free from infection  5/10/2019 1526 by Chepe Duncan RN  Outcome: Ongoing  5/10/2019 0219 by Talon Abernathy RN  Outcome: Ongoing     Problem: Safety:  Goal: Free from accidental physical injury  Description  Free from accidental physical injury  5/10/2019 1526 by Chepe Duncan RN  Outcome: Ongoing  5/10/2019 0219 by Talon Abernathy RN  Outcome: Ongoing  Goal: Free from intentional harm  Description  Free from intentional harm  5/10/2019 1526 by Chepe Duncan RN  Outcome: Ongoing  5/10/2019 0219 by Talon Abernathy RN  Outcome: Ongoing     Problem: Daily Care:  Goal: Daily care needs are met  Description  Daily care needs are met  5/10/2019 1526 by Chepe Duncan RN  Outcome: Ongoing  5/10/2019 0219 by Talon Abernathy RN  Outcome: Ongoing     Problem: Pain:  Goal: Patient's pain/discomfort is manageable  Description  Patient's pain/discomfort is manageable  5/10/2019 1526 by Chepe Duncan RN  Outcome: Ongoing  5/10/2019 0219 by Talon Abernathy RN  Outcome: Ongoing  Goal: Pain level will decrease  Description  Pain level will decrease  5/10/2019 1526 by Chepe Duncan RN  Outcome: Ongoing  5/10/2019 0219 by Talon Abernathy RN  Outcome: Ongoing  Goal: Control of acute pain  Description  Control of acute pain  5/10/2019 1526 by Chepe Duncan RN  Outcome: Ongoing  5/10/2019 0219 by Talon Abernathy RN  Outcome: Ongoing  Goal: Control of chronic pain  Description  Control of chronic pain  5/10/2019 1526 by Sania Barnett Kimberly Cote RN  Outcome: Ongoing  5/10/2019 0219 by Halima Molina RN  Outcome: Ongoing     Problem: Skin Integrity:  Goal: Skin integrity will stabilize  Description  Skin integrity will stabilize  5/10/2019 1526 by Wendi Isaac RN  Outcome: Ongoing  5/10/2019 0219 by Halima Molina RN  Outcome: Ongoing     Problem: Discharge Planning:  Goal: Patients continuum of care needs are met  Description  Patients continuum of care needs are met  5/10/2019 1526 by Wendi Isaac RN  Outcome: Ongoing  5/10/2019 0219 by Halima Molina RN  Outcome: Ongoing

## 2019-05-10 NOTE — PLAN OF CARE
Problem: Falls - Risk of:  Goal: Will remain free from falls  5/10/2019 0219 by Dipesh Antonio RN  Outcome: Ongoing  5/9/2019 1556 by Ingrid Hidalgo RN  Outcome: Ongoing  Goal: Absence of physical injury  5/10/2019 0219 by Dipesh Antonio RN  Outcome: Ongoing  5/9/2019 1556 by Ingrid Hidalgo RN  Outcome: Ongoing     Problem: Infection:  Goal: Will remain free from infection  5/10/2019 0219 by Dipesh Antonio RN  Outcome: Ongoing  5/9/2019 1556 by Ingrid Hidalgo RN  Outcome: Ongoing     Problem: Safety:  Goal: Free from accidental physical injury  5/10/2019 0219 by Dipesh Antonio RN  Outcome: Ongoing  5/9/2019 1556 by Ingrid Hidalgo RN  Outcome: Ongoing  Goal: Free from intentional harm  5/10/2019 0219 by Dipesh Antonio RN  Outcome: Ongoing  5/9/2019 1556 by Ingrid Hidalgo RN  Outcome: Ongoing     Problem: Daily Care:  Goal: Daily care needs are met  5/10/2019 0219 by Dipesh Antonio RN  Outcome: Ongoing  5/9/2019 1556 by Ingrid Hidalgo RN  Outcome: Ongoing     Problem: Pain:  Goal: Patient's pain/discomfort is manageable  5/10/2019 0219 by Dipesh Antonio RN  Outcome: Ongoing  5/9/2019 1556 by Ingrid Hidalgo RN  Outcome: Ongoing  Goal: Pain level will decrease  5/10/2019 0219 by Dipesh Antonio RN  Outcome: Ongoing  5/9/2019 1556 by Ingrid Hidalgo RN  Outcome: Ongoing  Goal: Control of acute pain  5/10/2019 0219 by Dipesh Antonio RN  Outcome: Ongoing  5/9/2019 1556 by Ingrid Hidalgo RN  Outcome: Ongoing  Goal: Control of chronic pain  5/10/2019 0219 by Dipesh Antonio RN  Outcome: Ongoing  5/9/2019 1556 by Ingrid Hidalgo RN  Outcome: Ongoing     Problem: Skin Integrity:  Goal: Skin integrity will stabilize  5/10/2019 0219 by Dipesh Antonio RN  Outcome: Ongoing  5/9/2019 1556 by Ingrid Hidalgo RN  Outcome: Ongoing     Problem: Discharge Planning:  Goal: Patients continuum of care needs are met  5/10/2019 0219 by Conda Neighbor, RN  Outcome: Ongoing  5/9/2019 1556 by Ingrid Hidalgo RN  Outcome: Ongoing

## 2019-05-11 LAB
CULTURE: NORMAL
GRAM SMEAR: NORMAL
Lab: NORMAL
SPECIMEN: NORMAL

## 2019-05-11 PROCEDURE — 6370000000 HC RX 637 (ALT 250 FOR IP): Performed by: HOSPITALIST

## 2019-05-11 PROCEDURE — 2580000003 HC RX 258: Performed by: HOSPITALIST

## 2019-05-11 RX ADMIN — SODIUM CHLORIDE: 9 INJECTION, SOLUTION INTRAVENOUS at 01:03

## 2019-05-11 RX ADMIN — OXYCODONE HYDROCHLORIDE 5 MG: 5 TABLET ORAL at 02:43

## 2019-05-11 ASSESSMENT — PAIN DESCRIPTION - PAIN TYPE: TYPE: ACUTE PAIN

## 2019-05-11 ASSESSMENT — PAIN DESCRIPTION - PROGRESSION: CLINICAL_PROGRESSION: GRADUALLY IMPROVING

## 2019-05-11 ASSESSMENT — PAIN DESCRIPTION - DESCRIPTORS: DESCRIPTORS: ACHING

## 2019-05-11 ASSESSMENT — PAIN DESCRIPTION - FREQUENCY: FREQUENCY: CONTINUOUS

## 2019-05-11 ASSESSMENT — PAIN DESCRIPTION - ONSET: ONSET: ON-GOING

## 2019-05-11 ASSESSMENT — PAIN SCALES - GENERAL: PAINLEVEL_OUTOF10: 6

## 2019-05-11 ASSESSMENT — PAIN DESCRIPTION - LOCATION: LOCATION: ABDOMEN

## 2019-05-13 LAB
EKG ATRIAL RATE: 82 BPM
EKG DIAGNOSIS: NORMAL
EKG P AXIS: 53 DEGREES
EKG P-R INTERVAL: 142 MS
EKG Q-T INTERVAL: 380 MS
EKG QRS DURATION: 72 MS
EKG QTC CALCULATION (BAZETT): 443 MS
EKG R AXIS: 5 DEGREES
EKG T AXIS: 14 DEGREES
EKG VENTRICULAR RATE: 82 BPM

## 2019-07-11 RX ORDER — SULFAMETHOXAZOLE AND TRIMETHOPRIM 800; 160 MG/1; MG/1
2 TABLET ORAL
COMMUNITY
Start: 2019-07-01 | End: 2019-07-31

## 2019-07-11 RX ORDER — ONDANSETRON 4 MG/1
4 TABLET, FILM COATED ORAL
COMMUNITY
Start: 2019-07-01

## 2019-07-11 NOTE — PROGRESS NOTES
Surgery 7/12/19 @ 0830, arrival 0630     1. Do not eat or drink anything after 12 midnight- unless instructed by your doctor prior to surgery. This includes no water, chewing gum or mints. 2. Follow your directions as prescribed by the doctor for your procedure and medications. Can take levothyroxine with a sip in am   3. Check with your Doctor regarding stopping Plavix, Coumadin, Lovenox,Effient,Pradaxa,Xarelto, Fragmin or other blood thinners and follow their instructions. 4. Do not smoke, and do not drink any alcoholic beverages 24 hours prior to surgery. This includes NA Beer. 5. You may brush your teeth and gargle the morning of surgery. DO NOT SWALLOW WATER   6. You MUST make arrangements for a responsible adult to take you home after your surgery and be able to check on you every couple hours for the day. You will not be allowed to leave alone or drive                     yourself home. It is strongly suggested someone stay with you the first 24 hrs. Your surgery will be cancelled if you do not have a ride home. 7. Please wear simple, loose fitting clothing to the hospital.  Gaylan Dura not bring valuables (money, credit cards, checkbooks, etc.) Do not wear any makeup (including no eye makeup) or nail polish on your                 fingers or toes. 8. DO NOT wear any jewelry or piercings on day of surgery. All body piercing jewelry must be removed. 9. If you have dentures, they will be removed before going to the OR; we will provide you a container. If you wear contact lenses or glasses, they will be removed; please bring a case for them. 10. If you  have a Living Will and Durable Power of  for Healthcare, please bring in a copy. 11 Please bring picture ID,  insurance card, paperwork from the doctors office    (H & P, Consent, & card for implantable devices).              12. Take a shower the night before or morning of your procedure, do not apply any

## 2019-07-12 ENCOUNTER — HOSPITAL ENCOUNTER (OUTPATIENT)
Dept: INTERVENTIONAL RADIOLOGY/VASCULAR | Age: 59
Discharge: HOME | End: 2019-07-12
Payer: COMMERCIAL

## 2019-07-12 ENCOUNTER — HOSPITAL ENCOUNTER (OUTPATIENT)
Dept: CT IMAGING | Age: 59
Discharge: HOME | End: 2019-07-12
Payer: COMMERCIAL

## 2019-07-12 VITALS
DIASTOLIC BLOOD PRESSURE: 72 MMHG | BODY MASS INDEX: 22.02 KG/M2 | OXYGEN SATURATION: 98 % | HEIGHT: 64 IN | WEIGHT: 129 LBS | RESPIRATION RATE: 16 BRPM | TEMPERATURE: 98.3 F | SYSTOLIC BLOOD PRESSURE: 117 MMHG | HEART RATE: 97 BPM

## 2019-07-12 DIAGNOSIS — L02.91 ABSCESS: ICD-10-CM

## 2019-07-12 LAB
ALBUMIN SERPL-MCNC: 3.3 GM/DL (ref 3.4–5)
ALP BLD-CCNC: 474 IU/L (ref 40–129)
ALT SERPL-CCNC: 23 U/L (ref 10–40)
ANION GAP SERPL CALCULATED.3IONS-SCNC: 10 MMOL/L (ref 4–16)
APTT: 30.3 SECONDS (ref 21.2–33)
AST SERPL-CCNC: 85 IU/L (ref 15–37)
BILIRUB SERPL-MCNC: 0.4 MG/DL (ref 0–1)
BUN BLDV-MCNC: 27 MG/DL (ref 6–23)
CALCIUM SERPL-MCNC: 9.9 MG/DL (ref 8.3–10.6)
CHLORIDE BLD-SCNC: 94 MMOL/L (ref 99–110)
CO2: 24 MMOL/L (ref 21–32)
CREAT SERPL-MCNC: 1.5 MG/DL (ref 0.9–1.3)
GFR AFRICAN AMERICAN: 58 ML/MIN/1.73M2
GFR NON-AFRICAN AMERICAN: 48 ML/MIN/1.73M2
GLUCOSE BLD-MCNC: 102 MG/DL (ref 70–99)
HCT VFR BLD CALC: 33.7 % (ref 42–52)
HEMOGLOBIN: 11 GM/DL (ref 13.5–18)
INR BLD: 1.03 INDEX
MCH RBC QN AUTO: 28.9 PG (ref 27–31)
MCHC RBC AUTO-ENTMCNC: 32.6 % (ref 32–36)
MCV RBC AUTO: 88.7 FL (ref 78–100)
PDW BLD-RTO: 15.3 % (ref 11.7–14.9)
PLATELET # BLD: 405 K/CU MM (ref 140–440)
PMV BLD AUTO: 9.1 FL (ref 7.5–11.1)
POTASSIUM SERPL-SCNC: 5 MMOL/L (ref 3.5–5.1)
PROTHROMBIN TIME: 11.7 SECONDS (ref 9.12–12.5)
RBC # BLD: 3.8 M/CU MM (ref 4.6–6.2)
SODIUM BLD-SCNC: 128 MMOL/L (ref 135–145)
TOTAL PROTEIN: 6.6 GM/DL (ref 6.4–8.2)
WBC # BLD: 13.2 K/CU MM (ref 4–10.5)

## 2019-07-12 PROCEDURE — 88341 IMHCHEM/IMCYTCHM EA ADD ANTB: CPT

## 2019-07-12 PROCEDURE — 85027 COMPLETE CBC AUTOMATED: CPT

## 2019-07-12 PROCEDURE — 2580000003 HC RX 258: Performed by: RADIOLOGY

## 2019-07-12 PROCEDURE — 88307 TISSUE EXAM BY PATHOLOGIST: CPT

## 2019-07-12 PROCEDURE — 80053 COMPREHEN METABOLIC PANEL: CPT

## 2019-07-12 PROCEDURE — 47000 NEEDLE BIOPSY OF LIVER PERQ: CPT

## 2019-07-12 PROCEDURE — 7100000010 HC PHASE II RECOVERY - FIRST 15 MIN

## 2019-07-12 PROCEDURE — 88334 PATH CONSLTJ SURG CYTO XM EA: CPT

## 2019-07-12 PROCEDURE — 6360000002 HC RX W HCPCS: Performed by: RADIOLOGY

## 2019-07-12 PROCEDURE — 88333 PATH CONSLTJ SURG CYTO XM 1: CPT

## 2019-07-12 PROCEDURE — 7100000011 HC PHASE II RECOVERY - ADDTL 15 MIN

## 2019-07-12 PROCEDURE — 85730 THROMBOPLASTIN TIME PARTIAL: CPT

## 2019-07-12 PROCEDURE — 85610 PROTHROMBIN TIME: CPT

## 2019-07-12 PROCEDURE — 2709999900 HC NON-CHARGEABLE SUPPLY

## 2019-07-12 PROCEDURE — 88342 IMHCHEM/IMCYTCHM 1ST ANTB: CPT

## 2019-07-12 RX ORDER — ACETAMINOPHEN 325 MG/1
650 TABLET ORAL EVERY 4 HOURS PRN
Status: DISCONTINUED | OUTPATIENT
Start: 2019-07-12 | End: 2019-07-13 | Stop reason: HOSPADM

## 2019-07-12 RX ORDER — SODIUM CHLORIDE 0.9 % (FLUSH) 0.9 %
10 SYRINGE (ML) INJECTION PRN
Status: DISCONTINUED | OUTPATIENT
Start: 2019-07-12 | End: 2019-07-13 | Stop reason: HOSPADM

## 2019-07-12 RX ORDER — MIDAZOLAM HYDROCHLORIDE 1 MG/ML
2 INJECTION INTRAMUSCULAR; INTRAVENOUS ONCE
Status: COMPLETED | OUTPATIENT
Start: 2019-07-12 | End: 2019-07-12

## 2019-07-12 RX ORDER — FENTANYL CITRATE 50 UG/ML
100 INJECTION, SOLUTION INTRAMUSCULAR; INTRAVENOUS ONCE
Status: COMPLETED | OUTPATIENT
Start: 2019-07-12 | End: 2019-07-12

## 2019-07-12 RX ORDER — SODIUM CHLORIDE 0.9 % (FLUSH) 0.9 %
SYRINGE (ML) INJECTION
Status: DISCONTINUED
Start: 2019-07-12 | End: 2019-07-12 | Stop reason: HOSPADM

## 2019-07-12 RX ORDER — SODIUM CHLORIDE 9 MG/ML
INJECTION, SOLUTION INTRAVENOUS CONTINUOUS
Status: DISCONTINUED | OUTPATIENT
Start: 2019-07-12 | End: 2019-07-13 | Stop reason: HOSPADM

## 2019-07-12 RX ADMIN — MIDAZOLAM HYDROCHLORIDE 2 MG: 1 INJECTION, SOLUTION INTRAMUSCULAR; INTRAVENOUS at 11:17

## 2019-07-12 RX ADMIN — FENTANYL CITRATE 100 MCG: 50 INJECTION INTRAMUSCULAR; INTRAVENOUS at 11:16

## 2019-07-12 RX ADMIN — SODIUM CHLORIDE: 9 INJECTION, SOLUTION INTRAVENOUS at 11:16

## 2019-07-12 ASSESSMENT — PAIN SCALES - GENERAL
PAINLEVEL_OUTOF10: 0

## 2019-07-12 ASSESSMENT — PAIN - FUNCTIONAL ASSESSMENT: PAIN_FUNCTIONAL_ASSESSMENT: 0-10

## 2019-07-12 NOTE — PROGRESS NOTES
1140 MICHAEL drain emptied 60 cc Frankewing tinged drainage. Pt denies c/o. Pt given Ice chips. Call light in reach. Family at bedside.

## 2019-07-30 ENCOUNTER — HOSPITAL ENCOUNTER (OUTPATIENT)
Age: 59
Setting detail: SPECIMEN
Discharge: HOME | End: 2019-07-30
Payer: COMMERCIAL

## 2019-07-30 LAB
ALBUMIN SERPL-MCNC: 3.2 GM/DL (ref 3.4–5)
ALP BLD-CCNC: 640 IU/L (ref 40–128)
ALT SERPL-CCNC: 32 U/L (ref 10–40)
ANION GAP SERPL CALCULATED.3IONS-SCNC: 12 MMOL/L (ref 4–16)
AST SERPL-CCNC: 77 IU/L (ref 15–37)
BILIRUB SERPL-MCNC: 0.4 MG/DL (ref 0–1)
BUN BLDV-MCNC: 21 MG/DL (ref 6–23)
CALCIUM SERPL-MCNC: 10.2 MG/DL (ref 8.3–10.6)
CHLORIDE BLD-SCNC: 93 MMOL/L (ref 99–110)
CO2: 28 MMOL/L (ref 21–32)
CREAT SERPL-MCNC: 1 MG/DL (ref 0.9–1.3)
GFR AFRICAN AMERICAN: >60 ML/MIN/1.73M2
GFR NON-AFRICAN AMERICAN: >60 ML/MIN/1.73M2
GLUCOSE BLD-MCNC: 96 MG/DL (ref 70–99)
POTASSIUM SERPL-SCNC: ABNORMAL MMOL/L (ref 3.5–5.1)
SODIUM BLD-SCNC: 133 MMOL/L (ref 135–145)
TOTAL PROTEIN: 6.2 GM/DL (ref 6.4–8.2)

## 2019-07-30 PROCEDURE — 80053 COMPREHEN METABOLIC PANEL: CPT

## 2019-08-07 ENCOUNTER — HOSPITAL ENCOUNTER (OUTPATIENT)
Age: 59
Setting detail: SPECIMEN
Discharge: HOME | End: 2019-08-07
Payer: COMMERCIAL

## 2019-08-07 LAB
C-REACTIVE PROTEIN, HIGH SENSITIVITY: 66.5 MG/L
ERYTHROCYTE SEDIMENTATION RATE: 111 MM/HR (ref 0–20)

## 2019-08-07 PROCEDURE — 85652 RBC SED RATE AUTOMATED: CPT

## 2019-08-07 PROCEDURE — 86140 C-REACTIVE PROTEIN: CPT

## 2019-09-04 ENCOUNTER — HOSPITAL ENCOUNTER (OUTPATIENT)
Age: 59
Setting detail: SPECIMEN
Discharge: HOME | End: 2019-09-04
Payer: COMMERCIAL

## 2019-09-04 LAB
C-REACTIVE PROTEIN, HIGH SENSITIVITY: 54.4 MG/L
ERYTHROCYTE SEDIMENTATION RATE: 115 MM/HR (ref 0–20)

## 2019-09-04 PROCEDURE — 86140 C-REACTIVE PROTEIN: CPT

## 2019-09-04 PROCEDURE — 85652 RBC SED RATE AUTOMATED: CPT

## 2019-09-20 ENCOUNTER — APPOINTMENT (OUTPATIENT)
Dept: CT IMAGING | Age: 59
End: 2019-09-20
Payer: COMMERCIAL

## 2019-09-20 ENCOUNTER — APPOINTMENT (OUTPATIENT)
Dept: INTERVENTIONAL RADIOLOGY/VASCULAR | Age: 59
End: 2019-09-20
Payer: COMMERCIAL

## 2019-09-20 ENCOUNTER — HOSPITAL ENCOUNTER (EMERGENCY)
Age: 59
Discharge: HOME | End: 2019-09-20
Attending: EMERGENCY MEDICINE
Payer: COMMERCIAL

## 2019-09-20 VITALS
DIASTOLIC BLOOD PRESSURE: 73 MMHG | BODY MASS INDEX: 21.07 KG/M2 | SYSTOLIC BLOOD PRESSURE: 106 MMHG | TEMPERATURE: 98.2 F | HEART RATE: 123 BPM | OXYGEN SATURATION: 98 % | RESPIRATION RATE: 14 BRPM | HEIGHT: 68 IN | WEIGHT: 139 LBS

## 2019-09-20 DIAGNOSIS — R16.0 LIVER MASSES: ICD-10-CM

## 2019-09-20 DIAGNOSIS — K75.0 LIVER ABSCESS: Primary | ICD-10-CM

## 2019-09-20 LAB
ANION GAP SERPL CALCULATED.3IONS-SCNC: 11 MMOL/L (ref 4–16)
BANDED NEUTROPHILS ABSOLUTE COUNT: 0.16 K/CU MM
BANDED NEUTROPHILS RELATIVE PERCENT: 2 % (ref 5–11)
BUN BLDV-MCNC: 24 MG/DL (ref 6–23)
CALCIUM SERPL-MCNC: 9.2 MG/DL (ref 8.3–10.6)
CHLORIDE BLD-SCNC: 94 MMOL/L (ref 99–110)
CO2: 25 MMOL/L (ref 21–32)
CREAT SERPL-MCNC: 1 MG/DL (ref 0.9–1.3)
DIFFERENTIAL TYPE: ABNORMAL
GFR AFRICAN AMERICAN: >60 ML/MIN/1.73M2
GFR NON-AFRICAN AMERICAN: >60 ML/MIN/1.73M2
GIANT PLATELETS: ABNORMAL
GLUCOSE BLD-MCNC: 113 MG/DL (ref 70–99)
HCT VFR BLD CALC: 32.6 % (ref 42–52)
HEMOGLOBIN: 10.4 GM/DL (ref 13.5–18)
LYMPHOCYTES ABSOLUTE: 2.2 K/CU MM
LYMPHOCYTES RELATIVE PERCENT: 27 % (ref 24–44)
MCH RBC QN AUTO: 29 PG (ref 27–31)
MCHC RBC AUTO-ENTMCNC: 31.9 % (ref 32–36)
MCV RBC AUTO: 90.8 FL (ref 78–100)
MONOCYTES ABSOLUTE: 1.5 K/CU MM
MONOCYTES RELATIVE PERCENT: 19 % (ref 0–4)
PDW BLD-RTO: 20.2 % (ref 11.7–14.9)
PLATELET # BLD: 268 K/CU MM (ref 140–440)
PLT MORPHOLOGY: ABNORMAL
PMV BLD AUTO: 10.1 FL (ref 7.5–11.1)
POLYCHROMASIA: ABNORMAL
POTASSIUM SERPL-SCNC: 5.4 MMOL/L (ref 3.5–5.1)
RBC # BLD: 3.59 M/CU MM (ref 4.6–6.2)
REASON FOR REJECTION: NORMAL
REASON FOR REJECTION: NORMAL
REJECTED TEST: NORMAL
SEGMENTED NEUTROPHILS ABSOLUTE COUNT: 4.2 K/CU MM
SEGMENTED NEUTROPHILS RELATIVE PERCENT: 52 % (ref 36–66)
SODIUM BLD-SCNC: 130 MMOL/L (ref 135–145)
TOXIC GRANULATION: PRESENT
WBC # BLD: 8.1 K/CU MM (ref 4–10.5)

## 2019-09-20 PROCEDURE — C1729 CATH, DRAINAGE: HCPCS

## 2019-09-20 PROCEDURE — 49423 EXCHANGE DRAINAGE CATHETER: CPT

## 2019-09-20 PROCEDURE — 87186 SC STD MICRODIL/AGAR DIL: CPT

## 2019-09-20 PROCEDURE — 96365 THER/PROPH/DIAG IV INF INIT: CPT

## 2019-09-20 PROCEDURE — 85027 COMPLETE CBC AUTOMATED: CPT

## 2019-09-20 PROCEDURE — 75984 XRAY CONTROL CATHETER CHANGE: CPT

## 2019-09-20 PROCEDURE — 99283 EMERGENCY DEPT VISIT LOW MDM: CPT

## 2019-09-20 PROCEDURE — 2709999900 HC NON-CHARGEABLE SUPPLY

## 2019-09-20 PROCEDURE — 87073 CULTURE BACTERIA ANAEROBIC: CPT

## 2019-09-20 PROCEDURE — 80053 COMPREHEN METABOLIC PANEL: CPT

## 2019-09-20 PROCEDURE — 87071 CULTURE AEROBIC QUANT OTHER: CPT

## 2019-09-20 PROCEDURE — 74177 CT ABD & PELVIS W/CONTRAST: CPT

## 2019-09-20 PROCEDURE — 6360000004 HC RX CONTRAST MEDICATION: Performed by: PHYSICIAN ASSISTANT

## 2019-09-20 PROCEDURE — 87205 SMEAR GRAM STAIN: CPT

## 2019-09-20 PROCEDURE — 80048 BASIC METABOLIC PNL TOTAL CA: CPT

## 2019-09-20 PROCEDURE — 87040 BLOOD CULTURE FOR BACTERIA: CPT

## 2019-09-20 PROCEDURE — 36415 COLL VENOUS BLD VENIPUNCTURE: CPT

## 2019-09-20 PROCEDURE — 85007 BL SMEAR W/DIFF WBC COUNT: CPT

## 2019-09-20 RX ORDER — FAMOTIDINE 20 MG/1
20 TABLET, FILM COATED ORAL 2 TIMES DAILY PRN
COMMUNITY

## 2019-09-20 RX ORDER — FUROSEMIDE 20 MG/1
20 TABLET ORAL SEE ADMIN INSTRUCTIONS
COMMUNITY

## 2019-09-20 RX ADMIN — IOPAMIDOL 75 ML: 755 INJECTION, SOLUTION INTRAVENOUS at 14:08

## 2019-09-20 NOTE — ED PROVIDER NOTES
Work-up included:  see above  -  ED treatment included:  Zosyn  -  Patient refused further blood work here in the ED, however, he was agreeable to start Zosyn. I spoke with the transfer center at [facility] and patient was accepted by Dr. Chloe Kumar in transfer. However, they did note on review of patient's chart that Dr. Washington Dumont had requested all of patient's care remain at [facility] except for Infectious Disease issues. I agreed to confirm with Dr. Washington Dumont that he was OK with plan for patient transfer. Consult was placed to Heme-Onc at the end of my shift. Dr. Melody Lara to speak with them. Plan for transfer to [facility]. FINAL IMPRESSION    1. Liver abscess    2.  Liver masses                Ernesto Jolly PA-C  09/21/19 6118

## 2019-09-20 NOTE — PROGRESS NOTES
his liver and no end date was given to therapy  · Per spouse when patient was given Bactrim and Flagyl at [facility] (given together) patient would vomit.  Due to previous throat cancer they try to avoid anything that would make patient vomit  · Per spouse when patient was given Vanco at [facility] he had a red steak on his arm that was being monitored as it kept growing    To my knowledge the above medication history is accurate as of 9/20/2019 6:59 PM.   Kandis Hdez CPhT   9/20/2019 6:59 PM

## 2019-09-20 NOTE — ED NOTES
Pt refuses further treatment at this time and states he will get labs and antibiotics at [address] [place] [address].      Layla Hardin RN  09/20/19 6429

## 2019-09-20 NOTE — ED PROVIDER NOTES
fluid from his drain. He states that if the patient does not appear to be septic and wants to go home that he is comfortable with this and will follow up his culture results and treat the patient appropriately. The patient is in agreement with this. He is given strict return precautions. The patient verbalized understanding, was agreeable with plan, was discharged in stable condition. Diagnostics:     IR CHANGE DRAINAGE CATH (Final result)   Result time 09/20/19 17:13:41   Final result by Irina Villanueva MD (09/20/19 17:13:41)                Impression:    Successful upsizing of an 8 Hungarian locking pigtail drainage catheter for a  new 10 Hungarian locking pigtail drainage catheter. moderate right-sided hydronephrosis which is new compared to prior CT of the  abdomen with and without IV contrast.            Narrative:    PROCEDURE:  IR CHANGE DRAINAGE CATH    9/20/2019    HISTORY:  ORDERING SYSTEM PROVIDED HISTORY: Liver masses  TECHNOLOGIST PROVIDED HISTORY:  Reason for exam:->Tube Change    CONTRAST:  None    SEDATION:  None    FLUOROSCOPY DOSE AND TYPE OR TIME AND EXPOSURES:  0.8 minutes of fluoroscopic time was utilized.  AK 43 mGy. DESCRIPTION OF PROCEDURE:  Informed consent was obtained after a detailed explanation of the procedure  including risks, benefits, and alternatives.  Universal protocol was  observed.  Patient is catheter exit site was prepped and draped in the usual  sterile fashion.  The catheter was then transected.  The pre-existing 8  Hungarian locking pigtail drainage catheter was removed over a stiff angled  tip  Glidewire which was looped within the cavity.  A new 10 Hungarian locking  pigtail drainage catheter was advanced over the guidewire and looped within  the fluid collection at the same site as the previous catheter.  The patient  tolerated the entire procedure well without immediate complications.  The  catheter was sutured in place.     Estimated Blood Loss: Less than 50 cc    Incidental note is made of moderate right hydronephrosis. Review the CT of the abdomen and pelvis reveals an even larger expansile  hepatic cystic fluid collection along the inferior aspect of the right lobe  of the liver.  This is the likely explanation for the moderate right  hydronephrosis. FINDINGS:  Adequate positioning of the new locking pigtail drainage catheter within the  hepatic cystic fluid collection.                    CT ABDOMEN PELVIS W IV CONTRAST Additional Contrast? None (Final result)   Result time 09/20/19 15:21:34   Final result by Andrew Calloway MD (09/20/19 15:21:34)                Impression:    Significant increase in size and number of abnormal fluid collections within  the liver likely secondary to abscess formation.  The largest is located at  the inferior aspect of the liver and measures 15 cm in diameter.  The fluid  collections virtually completely replace the right lobe of the liver. This report was called to Dr. Griselda Campi in the Emergency Department at 2:20  p.m. on 9/20/2019. Narrative:    EXAMINATION:  CT OF THE ABDOMEN AND PELVIS WITH CONTRAST 9/20/2019 2:07 pm    TECHNIQUE:  CT of the abdomen and pelvis was performed with the administration of  intravenous contrast. Multiplanar reformatted images are provided for review. Dose modulation, iterative reconstruction, and/or weight based adjustment of  the mA/kV was utilized to reduce the radiation dose to as low as reasonably  achievable. COMPARISON:  Prior CT scan done 7/12/2019, MRI done 5/6/2019, and CT done 5/4/2019    HISTORY:  ORDERING SYSTEM PROVIDED HISTORY: ADOLFO Drain  TECHNOLOGIST PROVIDED HISTORY:  Additional Contrast?->None  Reason for Exam: adolfo drain problems  Acuity: Acute  Type of Exam: Initial  Additional signs and symptoms:  ADOLFO drain malfunction.  Patient and wife state  that the drain has not been draining correctly for the last 2 days and not  holding air properly.   Relevant Medical/Surgical History: hx

## 2019-09-21 NOTE — ED NOTES
Drain bag secured to pt leg. Education provided in regards to new drain bag. Pt denies further questions.       Lizeth Buck RN  09/20/19 7761

## 2019-09-23 LAB
CULTURE: ABNORMAL
CULTURE: ABNORMAL
GRAM SMEAR: ABNORMAL
Lab: ABNORMAL
SPECIMEN: ABNORMAL

## 2019-09-25 ENCOUNTER — HOSPITAL ENCOUNTER (OUTPATIENT)
Age: 59
Setting detail: SPECIMEN
Discharge: HOME | End: 2019-09-25
Payer: COMMERCIAL

## 2019-09-25 LAB
ALBUMIN SERPL-MCNC: 2.7 GM/DL (ref 3.4–5)
ALP BLD-CCNC: 729 IU/L (ref 40–128)
ALT SERPL-CCNC: 26 U/L (ref 10–40)
ANION GAP SERPL CALCULATED.3IONS-SCNC: 15 MMOL/L (ref 4–16)
AST SERPL-CCNC: 68 IU/L (ref 15–37)
BILIRUB SERPL-MCNC: 0.5 MG/DL (ref 0–1)
BUN BLDV-MCNC: 27 MG/DL (ref 6–23)
C-REACTIVE PROTEIN, HIGH SENSITIVITY: 116 MG/L
CALCIUM SERPL-MCNC: 8.7 MG/DL (ref 8.3–10.6)
CHLORIDE BLD-SCNC: 92 MMOL/L (ref 99–110)
CO2: 21 MMOL/L (ref 21–32)
CREAT SERPL-MCNC: 1.1 MG/DL (ref 0.9–1.3)
CULTURE: NORMAL
CULTURE: NORMAL
ERYTHROCYTE SEDIMENTATION RATE: 119 MM/HR (ref 0–20)
GFR AFRICAN AMERICAN: >60 ML/MIN/1.73M2
GFR NON-AFRICAN AMERICAN: >60 ML/MIN/1.73M2
GLUCOSE BLD-MCNC: 106 MG/DL (ref 70–99)
Lab: NORMAL
Lab: NORMAL
POTASSIUM SERPL-SCNC: 5.4 MMOL/L (ref 3.5–5.1)
SODIUM BLD-SCNC: 128 MMOL/L (ref 135–145)
SPECIMEN: NORMAL
SPECIMEN: NORMAL
TOTAL PROTEIN: 5.6 GM/DL (ref 6.4–8.2)

## 2019-09-25 PROCEDURE — 85652 RBC SED RATE AUTOMATED: CPT

## 2019-09-25 PROCEDURE — 86140 C-REACTIVE PROTEIN: CPT

## 2019-09-25 PROCEDURE — 80053 COMPREHEN METABOLIC PANEL: CPT

## 2019-10-02 ENCOUNTER — HOSPITAL ENCOUNTER (OUTPATIENT)
Age: 59
Setting detail: SPECIMEN
Discharge: HOME | End: 2019-10-02
Payer: COMMERCIAL

## 2019-10-02 PROCEDURE — P9016 RBC LEUKOCYTES REDUCED: HCPCS

## 2019-10-02 PROCEDURE — 86850 RBC ANTIBODY SCREEN: CPT

## 2019-10-02 PROCEDURE — 86901 BLOOD TYPING SEROLOGIC RH(D): CPT

## 2019-10-02 PROCEDURE — 86922 COMPATIBILITY TEST ANTIGLOB: CPT

## 2019-10-02 PROCEDURE — 86900 BLOOD TYPING SEROLOGIC ABO: CPT

## 2019-10-03 ENCOUNTER — HOSPITAL ENCOUNTER (OUTPATIENT)
Dept: INFUSION THERAPY | Age: 59
Setting detail: INFUSION SERIES
Discharge: HOME | End: 2019-10-03
Payer: COMMERCIAL

## 2019-10-03 VITALS
DIASTOLIC BLOOD PRESSURE: 76 MMHG | TEMPERATURE: 98.8 F | SYSTOLIC BLOOD PRESSURE: 107 MMHG | RESPIRATION RATE: 12 BRPM | WEIGHT: 139 LBS | BODY MASS INDEX: 23.73 KG/M2 | OXYGEN SATURATION: 97 % | HEART RATE: 70 BPM | HEIGHT: 64 IN

## 2019-10-03 PROCEDURE — 2580000003 HC RX 258: Performed by: INTERNAL MEDICINE

## 2019-10-03 PROCEDURE — 36430 TRANSFUSION BLD/BLD COMPNT: CPT

## 2019-10-03 PROCEDURE — 99211 OFF/OP EST MAY X REQ PHY/QHP: CPT

## 2019-10-03 PROCEDURE — 6360000002 HC RX W HCPCS: Performed by: INTERNAL MEDICINE

## 2019-10-03 PROCEDURE — P9016 RBC LEUKOCYTES REDUCED: HCPCS

## 2019-10-03 PROCEDURE — 96374 THER/PROPH/DIAG INJ IV PUSH: CPT

## 2019-10-03 PROCEDURE — 6370000000 HC RX 637 (ALT 250 FOR IP): Performed by: INTERNAL MEDICINE

## 2019-10-03 RX ORDER — SULFAMETHOXAZOLE AND TRIMETHOPRIM 800; 160 MG/1; MG/1
1 TABLET ORAL 2 TIMES DAILY
COMMUNITY

## 2019-10-03 RX ORDER — ACETAMINOPHEN 325 MG/1
650 TABLET ORAL SEE ADMIN INSTRUCTIONS
Status: DISCONTINUED | OUTPATIENT
Start: 2019-10-03 | End: 2019-10-03

## 2019-10-03 RX ORDER — DIPHENHYDRAMINE HCL 25 MG
25 TABLET ORAL SEE ADMIN INSTRUCTIONS
Status: COMPLETED | OUTPATIENT
Start: 2019-10-03 | End: 2019-10-03

## 2019-10-03 RX ORDER — HEPARIN SODIUM (PORCINE) LOCK FLUSH IV SOLN 100 UNIT/ML 100 UNIT/ML
500 SOLUTION INTRAVENOUS PRN
Status: DISCONTINUED | OUTPATIENT
Start: 2019-10-03 | End: 2019-10-04 | Stop reason: HOSPADM

## 2019-10-03 RX ORDER — METHYLPREDNISOLONE SODIUM SUCCINATE 40 MG/ML
20 INJECTION, POWDER, LYOPHILIZED, FOR SOLUTION INTRAMUSCULAR; INTRAVENOUS ONCE
Status: DISCONTINUED | OUTPATIENT
Start: 2019-10-03 | End: 2019-10-03

## 2019-10-03 RX ORDER — 0.9 % SODIUM CHLORIDE 0.9 %
250 INTRAVENOUS SOLUTION INTRAVENOUS ONCE
Status: COMPLETED | OUTPATIENT
Start: 2019-10-03 | End: 2019-10-03

## 2019-10-03 RX ORDER — METHYLPREDNISOLONE SODIUM SUCCINATE 40 MG/ML
20 INJECTION, POWDER, LYOPHILIZED, FOR SOLUTION INTRAMUSCULAR; INTRAVENOUS ONCE
Status: COMPLETED | OUTPATIENT
Start: 2019-10-03 | End: 2019-10-03

## 2019-10-03 RX ORDER — DIPHENHYDRAMINE HCL 25 MG
25 TABLET ORAL SEE ADMIN INSTRUCTIONS
Status: DISCONTINUED | OUTPATIENT
Start: 2019-10-03 | End: 2019-10-03

## 2019-10-03 RX ORDER — ACETAMINOPHEN 325 MG/1
650 TABLET ORAL SEE ADMIN INSTRUCTIONS
Status: COMPLETED | OUTPATIENT
Start: 2019-10-03 | End: 2019-10-03

## 2019-10-03 RX ORDER — SODIUM CHLORIDE 0.9 % (FLUSH) 0.9 %
10 SYRINGE (ML) INJECTION PRN
Status: DISCONTINUED | OUTPATIENT
Start: 2019-10-03 | End: 2019-10-04 | Stop reason: HOSPADM

## 2019-10-03 RX ADMIN — METHYLPREDNISOLONE SODIUM SUCCINATE 20 MG: 40 INJECTION, POWDER, FOR SOLUTION INTRAMUSCULAR; INTRAVENOUS at 08:20

## 2019-10-03 RX ADMIN — Medication 10 ML: at 08:15

## 2019-10-03 RX ADMIN — DIPHENHYDRAMINE HYDROCHLORIDE 25 MG: 25 TABLET ORAL at 08:20

## 2019-10-03 RX ADMIN — SODIUM CHLORIDE 250 ML: 9 INJECTION, SOLUTION INTRAVENOUS at 08:20

## 2019-10-03 RX ADMIN — Medication 500 UNITS: at 11:25

## 2019-10-03 RX ADMIN — Medication 10 ML: at 11:25

## 2019-10-03 RX ADMIN — ACETAMINOPHEN 650 MG: 325 TABLET ORAL at 08:20

## 2019-10-03 RX ADMIN — Medication 10 ML: at 11:24

## 2019-10-03 ASSESSMENT — PAIN SCALES - GENERAL: PAINLEVEL_OUTOF10: 0

## 2019-10-03 NOTE — DISCHARGE SUMMARY
Tolerated Transfusion well. Discharge instructions explained written copy given. Understanding verbalized. Discharged home with family. Down to private auto per self.

## 2019-10-04 LAB
ABO/RH: NORMAL
ANTIBODY SCREEN: NEGATIVE
COMPONENT: NORMAL
CROSSMATCH RESULT: NORMAL
STATUS: NORMAL
TRANSFUSION STATUS: NORMAL
UNIT DIVISION: 0
UNIT NUMBER: NORMAL

## 2019-11-11 ENCOUNTER — HOSPITAL ENCOUNTER (OUTPATIENT)
Age: 59
Setting detail: SPECIMEN
Discharge: HOME | End: 2019-11-11
Payer: COMMERCIAL

## 2019-11-11 LAB
ALBUMIN SERPL-MCNC: 2.6 GM/DL (ref 3.4–5)
ALP BLD-CCNC: 942 IU/L (ref 40–128)
ALT SERPL-CCNC: 43 U/L (ref 10–40)
ANION GAP SERPL CALCULATED.3IONS-SCNC: 15 MMOL/L (ref 4–16)
AST SERPL-CCNC: 88 IU/L (ref 15–37)
BILIRUB SERPL-MCNC: 1.3 MG/DL (ref 0–1)
BUN BLDV-MCNC: 35 MG/DL (ref 6–23)
CALCIUM SERPL-MCNC: 10.7 MG/DL (ref 8.3–10.6)
CHLORIDE BLD-SCNC: 85 MMOL/L (ref 99–110)
CO2: 21 MMOL/L (ref 21–32)
CREAT SERPL-MCNC: 1.6 MG/DL (ref 0.9–1.3)
GFR AFRICAN AMERICAN: 54 ML/MIN/1.73M2
GFR NON-AFRICAN AMERICAN: 45 ML/MIN/1.73M2
GLUCOSE BLD-MCNC: 114 MG/DL (ref 70–99)
POTASSIUM SERPL-SCNC: 5.5 MMOL/L (ref 3.5–5.1)
SODIUM BLD-SCNC: 121 MMOL/L (ref 135–145)
TOTAL PROTEIN: 5.2 GM/DL (ref 6.4–8.2)

## 2019-11-11 PROCEDURE — 80053 COMPREHEN METABOLIC PANEL: CPT

## 2022-04-08 NOTE — PLAN OF CARE
Problem: Falls - Risk of:  Goal: Will remain free from falls  5/9/2019 0023 by Dipesh Antonio RN  Outcome: Ongoing  5/8/2019 1419 by Ingrid Hidalgo RN  Outcome: Ongoing  Goal: Absence of physical injury  5/9/2019 0023 by Dipesh Antonio RN  Outcome: Ongoing  5/8/2019 1419 by Ingrid Hidalgo RN  Outcome: Ongoing     Problem: Infection:  Goal: Will remain free from infection  5/9/2019 0023 by Dipesh Antonio RN  Outcome: Ongoing  5/8/2019 1419 by Ingrid Hidalgo RN  Outcome: Ongoing     Problem: Safety:  Goal: Free from accidental physical injury  5/9/2019 0023 by Dipesh Antonio RN  Outcome: Ongoing  5/8/2019 1419 by Ingrid Hidalgo RN  Outcome: Ongoing  Goal: Free from intentional harm  5/9/2019 0023 by Dipesh Antonio RN  Outcome: Ongoing  5/8/2019 1419 by Ingrid Hidalgo RN  Outcome: Ongoing     Problem: Daily Care:  Goal: Daily care needs are met  5/9/2019 0023 by Dipesh Antonio RN  Outcome: Ongoing  5/8/2019 1419 by Ingrid Hidalgo RN  Outcome: Ongoing     Problem: Pain:  Goal: Patient's pain/discomfort is manageable  5/9/2019 0023 by Dipesh Antonio RN  Outcome: Ongoing  5/8/2019 1419 by Ingrid Hidalgo RN  Outcome: Ongoing  Goal: Pain level will decrease  5/9/2019 0023 by Dipesh Antonio RN  Outcome: Ongoing  5/8/2019 1419 by Ingrid Hidalgo RN  Outcome: Ongoing  Goal: Control of acute pain  5/9/2019 0023 by Dipesh Antonio RN  Outcome: Ongoing  5/8/2019 1419 by Ingrid Hidalgo RN  Outcome: Ongoing  Goal: Control of chronic pain  5/9/2019 0023 by Dipesh Antonio RN  Outcome: Ongoing  5/8/2019 1419 by Ingrid Hidalgo RN  Outcome: Ongoing     Problem: Skin Integrity:  Goal: Skin integrity will stabilize  5/9/2019 0023 by Dipesh Antonio RN  Outcome: Ongoing  5/8/2019 1419 by Ingrid Hidalgo RN  Outcome: Ongoing     Problem: Discharge Planning:  Goal: Patients continuum of care needs are met  5/9/2019 0023 by Conda Neighbor, RN  Outcome: Ongoing  5/8/2019 1419 by Ingrid Hidalgo RN  Outcome: Ongoing Pt tolerated US Paracentesis procedure well. VSS. Dressing CDI to RLQ abdomen. Presently denies pain. Updated pt and family on plan of care. Report given to [facility] and awaiting transport to  # [phone].